# Patient Record
Sex: FEMALE | Race: BLACK OR AFRICAN AMERICAN | NOT HISPANIC OR LATINO | Employment: FULL TIME | URBAN - METROPOLITAN AREA
[De-identification: names, ages, dates, MRNs, and addresses within clinical notes are randomized per-mention and may not be internally consistent; named-entity substitution may affect disease eponyms.]

---

## 2023-03-22 LAB
ALANINE AMINOTRANSFERASE (SGPT) (U/L) IN SER/PLAS: 15 U/L (ref 7–45)
ALBUMIN (G/DL) IN SER/PLAS: 4.3 G/DL (ref 3.4–5)
ALKALINE PHOSPHATASE (U/L) IN SER/PLAS: 92 U/L (ref 33–110)
ANION GAP IN SER/PLAS: 10 MMOL/L (ref 10–20)
ASPARTATE AMINOTRANSFERASE (SGOT) (U/L) IN SER/PLAS: 26 U/L (ref 9–39)
BILIRUBIN TOTAL (MG/DL) IN SER/PLAS: 0.4 MG/DL (ref 0–1.2)
CALCIDIOL (25 OH VITAMIN D3) (NG/ML) IN SER/PLAS: 36 NG/ML
CALCIUM (MG/DL) IN SER/PLAS: 9.5 MG/DL (ref 8.6–10.3)
CARBON DIOXIDE, TOTAL (MMOL/L) IN SER/PLAS: 27 MMOL/L (ref 21–32)
CHLORIDE (MMOL/L) IN SER/PLAS: 106 MMOL/L (ref 98–107)
CHOLESTEROL (MG/DL) IN SER/PLAS: 189 MG/DL (ref 0–199)
CHOLESTEROL IN HDL (MG/DL) IN SER/PLAS: 67.2 MG/DL
CHOLESTEROL/HDL RATIO: 2.8
CREATININE (MG/DL) IN SER/PLAS: 0.6 MG/DL (ref 0.5–1.05)
GFR FEMALE: >90 ML/MIN/1.73M2
GLUCOSE (MG/DL) IN SER/PLAS: 85 MG/DL (ref 74–99)
LDL: 105 MG/DL (ref 0–99)
POTASSIUM (MMOL/L) IN SER/PLAS: 4.1 MMOL/L (ref 3.5–5.3)
PROTEIN TOTAL: 7.4 G/DL (ref 6.4–8.2)
SODIUM (MMOL/L) IN SER/PLAS: 139 MMOL/L (ref 136–145)
TRIGLYCERIDE (MG/DL) IN SER/PLAS: 84 MG/DL (ref 0–149)
UREA NITROGEN (MG/DL) IN SER/PLAS: 8 MG/DL (ref 6–23)
VLDL: 17 MG/DL (ref 0–40)

## 2023-03-23 LAB
ESTIMATED AVERAGE GLUCOSE FOR HBA1C: 120 MG/DL
HEMOGLOBIN A1C/HEMOGLOBIN TOTAL IN BLOOD: 5.8 %

## 2023-05-11 LAB — URINE CULTURE: NO GROWTH

## 2023-08-11 LAB
ALANINE AMINOTRANSFERASE (SGPT) (U/L) IN SER/PLAS: 11 U/L (ref 7–45)
ALBUMIN (G/DL) IN SER/PLAS: 4.5 G/DL (ref 3.4–5)
ALKALINE PHOSPHATASE (U/L) IN SER/PLAS: 80 U/L (ref 33–110)
ANION GAP IN SER/PLAS: 11 MMOL/L (ref 10–20)
ASPARTATE AMINOTRANSFERASE (SGOT) (U/L) IN SER/PLAS: 21 U/L (ref 9–39)
BILIRUBIN TOTAL (MG/DL) IN SER/PLAS: 0.6 MG/DL (ref 0–1.2)
CALCIUM (MG/DL) IN SER/PLAS: 9.4 MG/DL (ref 8.6–10.3)
CARBON DIOXIDE, TOTAL (MMOL/L) IN SER/PLAS: 26 MMOL/L (ref 21–32)
CHLORIDE (MMOL/L) IN SER/PLAS: 106 MMOL/L (ref 98–107)
CHOLESTEROL (MG/DL) IN SER/PLAS: 190 MG/DL (ref 0–199)
CHOLESTEROL IN HDL (MG/DL) IN SER/PLAS: 71 MG/DL
CHOLESTEROL/HDL RATIO: 2.7
CREATININE (MG/DL) IN SER/PLAS: 0.64 MG/DL (ref 0.5–1.05)
GFR FEMALE: >90 ML/MIN/1.73M2
GLUCOSE (MG/DL) IN SER/PLAS: 82 MG/DL (ref 74–99)
LDL: 104 MG/DL (ref 0–99)
POTASSIUM (MMOL/L) IN SER/PLAS: 3.8 MMOL/L (ref 3.5–5.3)
PROTEIN TOTAL: 7.4 G/DL (ref 6.4–8.2)
SODIUM (MMOL/L) IN SER/PLAS: 139 MMOL/L (ref 136–145)
TRIGLYCERIDE (MG/DL) IN SER/PLAS: 74 MG/DL (ref 0–149)
UREA NITROGEN (MG/DL) IN SER/PLAS: 7 MG/DL (ref 6–23)
VLDL: 15 MG/DL (ref 0–40)

## 2023-10-06 PROBLEM — I48.0 PAROXYSMAL ATRIAL FIBRILLATION (MULTI): Status: ACTIVE | Noted: 2023-10-06

## 2023-10-06 PROBLEM — F41.9 ANXIETY: Status: ACTIVE | Noted: 2023-10-06

## 2023-10-06 PROBLEM — E78.5 HYPERLIPIDEMIA: Status: ACTIVE | Noted: 2023-10-06

## 2023-10-06 PROBLEM — K21.9 GERD (GASTROESOPHAGEAL REFLUX DISEASE): Status: ACTIVE | Noted: 2023-10-06

## 2023-10-06 PROBLEM — K12.2 ORAL INFECTION: Status: ACTIVE | Noted: 2023-10-06

## 2023-10-06 PROBLEM — E55.9 VITAMIN D DEFICIENCY: Status: ACTIVE | Noted: 2023-10-06

## 2023-10-06 RX ORDER — METHYLPREDNISOLONE 4 MG/1
TABLET ORAL
COMMUNITY
Start: 2022-08-28 | End: 2024-04-03 | Stop reason: WASHOUT

## 2023-10-06 RX ORDER — IBUPROFEN 600 MG/1
600 TABLET ORAL 3 TIMES DAILY PRN
COMMUNITY
Start: 2022-08-28 | End: 2024-04-03 | Stop reason: WASHOUT

## 2023-10-06 RX ORDER — ACETAMINOPHEN 500 MG
1 TABLET ORAL DAILY
COMMUNITY
Start: 2018-09-05 | End: 2024-04-03 | Stop reason: SDUPTHER

## 2023-10-06 RX ORDER — DILTIAZEM HYDROCHLORIDE 120 MG/1
1 CAPSULE, EXTENDED RELEASE ORAL DAILY
COMMUNITY
Start: 2023-07-24 | End: 2024-04-03 | Stop reason: WASHOUT

## 2023-10-06 RX ORDER — SERTRALINE HYDROCHLORIDE 100 MG/1
100 TABLET, FILM COATED ORAL 2 TIMES DAILY
COMMUNITY
Start: 2007-08-17 | End: 2024-04-03 | Stop reason: SDUPTHER

## 2023-10-06 RX ORDER — OMEPRAZOLE 20 MG/1
20 CAPSULE, DELAYED RELEASE ORAL DAILY
COMMUNITY
Start: 2020-09-30 | End: 2024-04-03 | Stop reason: SDUPTHER

## 2023-10-06 RX ORDER — ATORVASTATIN CALCIUM 20 MG/1
20 TABLET, FILM COATED ORAL DAILY
COMMUNITY
Start: 2018-09-05 | End: 2024-03-11

## 2023-10-11 ENCOUNTER — OFFICE VISIT (OUTPATIENT)
Dept: PRIMARY CARE | Facility: CLINIC | Age: 53
End: 2023-10-11
Payer: COMMERCIAL

## 2023-10-11 VITALS
TEMPERATURE: 98.3 F | WEIGHT: 214 LBS | HEART RATE: 89 BPM | BODY MASS INDEX: 33.59 KG/M2 | DIASTOLIC BLOOD PRESSURE: 66 MMHG | SYSTOLIC BLOOD PRESSURE: 126 MMHG | OXYGEN SATURATION: 97 % | HEIGHT: 67 IN

## 2023-10-11 DIAGNOSIS — E55.9 VITAMIN D DEFICIENCY: ICD-10-CM

## 2023-10-11 DIAGNOSIS — F41.9 ANXIETY: Primary | ICD-10-CM

## 2023-10-11 DIAGNOSIS — K21.9 GASTROESOPHAGEAL REFLUX DISEASE, UNSPECIFIED WHETHER ESOPHAGITIS PRESENT: ICD-10-CM

## 2023-10-11 DIAGNOSIS — I48.0 PAROXYSMAL ATRIAL FIBRILLATION (MULTI): ICD-10-CM

## 2023-10-11 DIAGNOSIS — E78.5 HYPERLIPIDEMIA, UNSPECIFIED HYPERLIPIDEMIA TYPE: ICD-10-CM

## 2023-10-11 PROCEDURE — 1036F TOBACCO NON-USER: CPT | Performed by: PHYSICIAN ASSISTANT

## 2023-10-11 PROCEDURE — 99396 PREV VISIT EST AGE 40-64: CPT | Performed by: PHYSICIAN ASSISTANT

## 2023-10-11 RX ORDER — ERGOCALCIFEROL 1.25 MG/1
50000 CAPSULE ORAL WEEKLY
COMMUNITY
End: 2024-04-03 | Stop reason: WASHOUT

## 2023-10-11 RX ORDER — DILTIAZEM HYDROCHLORIDE 120 MG/1
120 CAPSULE, COATED, EXTENDED RELEASE ORAL DAILY
COMMUNITY
Start: 2023-09-14 | End: 2024-04-03 | Stop reason: SDUPTHER

## 2023-10-11 RX ORDER — SIMVASTATIN 40 MG/1
40 TABLET, FILM COATED ORAL NIGHTLY
COMMUNITY
End: 2024-04-03 | Stop reason: WASHOUT

## 2023-10-11 ASSESSMENT — ENCOUNTER SYMPTOMS
CARDIOVASCULAR NEGATIVE: 1
HEMATOLOGIC/LYMPHATIC NEGATIVE: 1
PSYCHIATRIC NEGATIVE: 1
CONSTITUTIONAL NEGATIVE: 1
NEUROLOGICAL NEGATIVE: 1
ENDOCRINE NEGATIVE: 1
ALLERGIC/IMMUNOLOGIC NEGATIVE: 1
EYES NEGATIVE: 1
RESPIRATORY NEGATIVE: 1

## 2023-10-11 ASSESSMENT — PAIN SCALES - GENERAL: PAINLEVEL: 0-NO PAIN

## 2023-10-11 NOTE — PROGRESS NOTES
Subjective   Patient ID: Jud Colunga is a 52 y.o. female who presents for a well exam      HPI Here for physical and fill out forms for insurance.    Pt reports she is UTD with colon screening but is overdue for mammogram testing. She is not due to have dexa scan testing. PT is not due for pap/pelvic testing  PT denies family hx for breast, colon, cervical or ovarian cancer.   Pt reports she has UTD with dental and eye exams.   She denies recreational drug use and  tobacco use - reports occasional ETOH use.        Review of Systems   Constitutional: Negative.    HENT: Negative.     Eyes: Negative.    Respiratory: Negative.     Cardiovascular: Negative.    Endocrine: Negative.    Genitourinary: Negative.    Skin: Negative.    Allergic/Immunologic: Negative.    Neurological: Negative.    Hematological: Negative.    Psychiatric/Behavioral: Negative.         Objective   There were no vitals taken for this visit.    Physical Exam  Constitutional:       Appearance: Normal appearance.   HENT:      Head: Normocephalic and atraumatic.      Nose: Nose normal.      Mouth/Throat:      Mouth: Mucous membranes are moist.      Pharynx: Oropharynx is clear.   Eyes:      Extraocular Movements: Extraocular movements intact.      Conjunctiva/sclera: Conjunctivae normal.      Pupils: Pupils are equal, round, and reactive to light.   Cardiovascular:      Rate and Rhythm: Normal rate and regular rhythm.      Heart sounds: Normal heart sounds.   Pulmonary:      Effort: Pulmonary effort is normal.      Breath sounds: Normal breath sounds.   Abdominal:      General: Bowel sounds are normal.      Palpations: Abdomen is soft.   Musculoskeletal:         General: Normal range of motion.      Cervical back: Normal range of motion and neck supple.   Skin:     General: Skin is warm and dry.      Coloration: Skin is not jaundiced.   Neurological:      General: No focal deficit present.      Mental Status: She is alert and oriented to person,  place, and time.   Psychiatric:         Mood and Affect: Mood normal.         Behavior: Behavior normal.         Thought Content: Thought content normal.         Judgment: Judgment normal.         Assessment/Plan 53 YO female presents for her annual well exam. She states she follows with GYN svcs for her pap and mammogram testing and reports she is UTD with both. She is UTD with colon screening. We discussed her labs from her appt in August of a CMP and Lipid - she wishes to have these for her well exam labs and does not want to have a CBC completed or any additional lab screenings. We discussed Tdap and pt does not wish to have any additional vaccines done right now - she will consider it in the future. She will follow up when she is due for refills - sooner if she has any complications.

## 2024-04-03 ENCOUNTER — LAB (OUTPATIENT)
Dept: LAB | Facility: LAB | Age: 54
End: 2024-04-03
Payer: COMMERCIAL

## 2024-04-03 ENCOUNTER — OFFICE VISIT (OUTPATIENT)
Dept: PRIMARY CARE | Facility: CLINIC | Age: 54
End: 2024-04-03
Payer: COMMERCIAL

## 2024-04-03 VITALS
HEART RATE: 86 BPM | DIASTOLIC BLOOD PRESSURE: 72 MMHG | WEIGHT: 217 LBS | OXYGEN SATURATION: 94 % | HEIGHT: 67 IN | SYSTOLIC BLOOD PRESSURE: 126 MMHG | BODY MASS INDEX: 34.06 KG/M2

## 2024-04-03 DIAGNOSIS — I48.0 PAROXYSMAL ATRIAL FIBRILLATION (MULTI): ICD-10-CM

## 2024-04-03 DIAGNOSIS — K21.9 GASTROESOPHAGEAL REFLUX DISEASE, UNSPECIFIED WHETHER ESOPHAGITIS PRESENT: ICD-10-CM

## 2024-04-03 DIAGNOSIS — Z12.31 BREAST CANCER SCREENING BY MAMMOGRAM: ICD-10-CM

## 2024-04-03 DIAGNOSIS — E78.5 HYPERLIPIDEMIA, UNSPECIFIED HYPERLIPIDEMIA TYPE: ICD-10-CM

## 2024-04-03 DIAGNOSIS — R73.9 HYPERGLYCEMIA: ICD-10-CM

## 2024-04-03 DIAGNOSIS — E78.5 HYPERLIPIDEMIA, UNSPECIFIED HYPERLIPIDEMIA TYPE: Primary | ICD-10-CM

## 2024-04-03 DIAGNOSIS — F41.9 ANXIETY: ICD-10-CM

## 2024-04-03 DIAGNOSIS — E55.9 VITAMIN D DEFICIENCY: ICD-10-CM

## 2024-04-03 PROBLEM — R52 PAIN: Status: RESOLVED | Noted: 2018-12-26 | Resolved: 2024-04-03

## 2024-04-03 PROBLEM — L25.9 CONTACT DERMATITIS: Status: RESOLVED | Noted: 2024-04-03 | Resolved: 2024-04-03

## 2024-04-03 PROBLEM — Z86.39 HISTORY OF ELEVATED LIPIDS: Status: RESOLVED | Noted: 2024-04-03 | Resolved: 2024-04-03

## 2024-04-03 PROBLEM — D72.829 LEUKOCYTOSIS: Status: RESOLVED | Noted: 2024-04-03 | Resolved: 2024-04-03

## 2024-04-03 PROBLEM — R30.0 DYSURIA: Status: RESOLVED | Noted: 2024-04-03 | Resolved: 2024-04-03

## 2024-04-03 PROBLEM — R42 DIZZINESS: Status: RESOLVED | Noted: 2021-07-15 | Resolved: 2024-04-03

## 2024-04-03 PROBLEM — M25.512 PAIN OF LEFT SHOULDER REGION: Status: RESOLVED | Noted: 2024-04-03 | Resolved: 2024-04-03

## 2024-04-03 PROBLEM — I48.92 ATRIAL FLUTTER (MULTI): Status: RESOLVED | Noted: 2019-01-12 | Resolved: 2024-04-03

## 2024-04-03 PROBLEM — M25.532 PAIN IN LEFT WRIST: Status: RESOLVED | Noted: 2018-12-26 | Resolved: 2024-04-03

## 2024-04-03 PROBLEM — E66.9 OBESITY: Status: ACTIVE | Noted: 2024-04-03

## 2024-04-03 PROBLEM — R42 VERTIGO: Status: ACTIVE | Noted: 2021-07-15

## 2024-04-03 PROBLEM — I10 HYPERTENSION: Status: ACTIVE | Noted: 2024-04-03

## 2024-04-03 LAB
ALBUMIN SERPL BCP-MCNC: 4.6 G/DL (ref 3.4–5)
ALP SERPL-CCNC: 95 U/L (ref 33–110)
ALT SERPL W P-5'-P-CCNC: 13 U/L (ref 7–45)
ANION GAP SERPL CALC-SCNC: 13 MMOL/L (ref 10–20)
AST SERPL W P-5'-P-CCNC: 24 U/L (ref 9–39)
BILIRUB SERPL-MCNC: 0.4 MG/DL (ref 0–1.2)
BUN SERPL-MCNC: 9 MG/DL (ref 6–23)
CALCIUM SERPL-MCNC: 9.3 MG/DL (ref 8.6–10.3)
CHLORIDE SERPL-SCNC: 103 MMOL/L (ref 98–107)
CHOLEST SERPL-MCNC: 204 MG/DL (ref 0–199)
CHOLESTEROL/HDL RATIO: 2.5
CO2 SERPL-SCNC: 26 MMOL/L (ref 21–32)
CREAT SERPL-MCNC: 0.69 MG/DL (ref 0.5–1.05)
EGFRCR SERPLBLD CKD-EPI 2021: >90 ML/MIN/1.73M*2
GLUCOSE SERPL-MCNC: 79 MG/DL (ref 74–99)
HDLC SERPL-MCNC: 82.6 MG/DL
LDLC SERPL CALC-MCNC: 108 MG/DL
NON HDL CHOLESTEROL: 121 MG/DL (ref 0–149)
POTASSIUM SERPL-SCNC: 4.3 MMOL/L (ref 3.5–5.3)
PROT SERPL-MCNC: 7.1 G/DL (ref 6.4–8.2)
SODIUM SERPL-SCNC: 138 MMOL/L (ref 136–145)
TRIGL SERPL-MCNC: 69 MG/DL (ref 0–149)
VLDL: 14 MG/DL (ref 0–40)

## 2024-04-03 PROCEDURE — 36415 COLL VENOUS BLD VENIPUNCTURE: CPT

## 2024-04-03 PROCEDURE — 99214 OFFICE O/P EST MOD 30 MIN: CPT | Performed by: PHYSICIAN ASSISTANT

## 2024-04-03 PROCEDURE — 83036 HEMOGLOBIN GLYCOSYLATED A1C: CPT

## 2024-04-03 PROCEDURE — 3078F DIAST BP <80 MM HG: CPT | Performed by: PHYSICIAN ASSISTANT

## 2024-04-03 PROCEDURE — 80053 COMPREHEN METABOLIC PANEL: CPT

## 2024-04-03 PROCEDURE — 80061 LIPID PANEL: CPT

## 2024-04-03 PROCEDURE — 3074F SYST BP LT 130 MM HG: CPT | Performed by: PHYSICIAN ASSISTANT

## 2024-04-03 PROCEDURE — 1036F TOBACCO NON-USER: CPT | Performed by: PHYSICIAN ASSISTANT

## 2024-04-03 RX ORDER — SERTRALINE HYDROCHLORIDE 100 MG/1
200 TABLET, FILM COATED ORAL DAILY
Qty: 180 TABLET | Refills: 1 | Status: SHIPPED | OUTPATIENT
Start: 2024-04-03 | End: 2024-09-30

## 2024-04-03 RX ORDER — DILTIAZEM HYDROCHLORIDE 120 MG/1
120 CAPSULE, COATED, EXTENDED RELEASE ORAL DAILY
Qty: 90 CAPSULE | Refills: 1 | Status: SHIPPED | OUTPATIENT
Start: 2024-04-03 | End: 2024-09-30

## 2024-04-03 RX ORDER — ACETAMINOPHEN 500 MG
2000 TABLET ORAL DAILY
Qty: 90 CAPSULE | Refills: 1 | Status: SHIPPED | OUTPATIENT
Start: 2024-04-03 | End: 2024-09-30

## 2024-04-03 RX ORDER — OMEPRAZOLE 20 MG/1
20 CAPSULE, DELAYED RELEASE ORAL DAILY
Qty: 90 CAPSULE | Refills: 1 | Status: SHIPPED | OUTPATIENT
Start: 2024-04-03 | End: 2024-09-30

## 2024-04-03 RX ORDER — ATORVASTATIN CALCIUM 20 MG/1
20 TABLET, FILM COATED ORAL DAILY
Qty: 90 TABLET | Refills: 1 | Status: SHIPPED | OUTPATIENT
Start: 2024-04-03 | End: 2024-09-30

## 2024-04-03 NOTE — ASSESSMENT & PLAN NOTE
PT is stable/asymptomatic with medication - seeing cardiology as needed - we continue monitoring sx's.

## 2024-04-03 NOTE — PROGRESS NOTES
"Subjective   Patient ID: Jud Colunga is a 53 y.o. female who presents for Med Refill.    HPI Pt is here for refills of medications to treat the following medical conditions. Unless otherwise stated, these conditions are well-controlled, pt is taking medications s Rx, and there are no reported side effects to medications or other treatment:  Proximal Afib, Hyperlipidemia, Vit D def, GERD and anxiety    Review of Systems  Constitutional: Negative.    HENT: Negative.     Respiratory: Negative.  Negative for cough, shortness of breath and wheezing.    Cardiovascular: Negative.  Negative for chest pain and palpitations.   Gastrointestinal: Negative.    Musculoskeletal: Negative.    Neurological: Negative.    Hematological: Negative.    Psychiatric/Behavioral: Negative.     All other systems reviewed and are negative.      Objective   /72 (BP Location: Right arm, Patient Position: Sitting, BP Cuff Size: Large adult)   Pulse 86   Ht 1.702 m (5' 7\")   Wt 98.4 kg (217 lb)   SpO2 94%   BMI 33.99 kg/m²     Physical Exam  Vitals and nursing note reviewed.   Constitutional:       General Appearance: Normal appearance, no distress, not ill-appearing.   HENT:      Head: Normocephalic and atraumatic.      Mouth/Throat:  MMM, Oropharynx is clear without erythema or exudate  Eyes:      Conjunctiva/sclera: Conjunctivae normal.   Cardiovascular:      Normal rate and regular rhythm: Normal heart sounds.   Pulmonary:      Pulmonary effort is normal. Normal breath sounds. No wheezing.   Abdominal:      General: Bowel sounds are normal. Abdomen is soft, non- tender, no masses.  Musculoskeletal:         General: Normal range of motion.    Lymphadenopathy:      Cervical:  Supple, non-tender, no cervical adenopathy.   Skin:     General: Skin is warm and dry, no rash or jaundice.    Neurological:      No focal deficit present. Alert and oriented to person, place, and time.   Psychiatric:         Mood and Affect: Mood normal.        "  Behavior: Behavior normal.         Thought Content: Thought content normal.         Judgment: Judgment normal.       Assessment/Plan Jud Colunga presents for chronic medication refills without complaint.  She appears to be doing well - her exam was unremarkable. Will renew chronic medication(s) without changes and check labs. She will follow up if necessary once labs are reviewed otherwise we will see her back when she is  due for refills - sooner if she has  any complications.    Diagnoses and all orders for this visit:  Hyperlipidemia, unspecified hyperlipidemia type  -     Lipid Panel; Future  -     Comprehensive Metabolic Panel; Future  -     atorvastatin (Lipitor) 20 mg tablet; Take 1 tablet (20 mg) by mouth once daily. as directed.   Appointment needed for refills.  Breast cancer screening by mammogram  -     BI mammo bilateral screening tomosynthesis; Future  Paroxysmal atrial fibrillation (CMS/HCC)  -     dilTIAZem CD (Cardizem CD) 120 mg 24 hr capsule; Take 1 capsule (120 mg) by mouth once daily.  Hyperglycemia  -     Hemoglobin A1C; Future  Vitamin D deficiency  -     cholecalciferol (Vitamin D-3) 50 mcg (2,000 unit) capsule; Take 1 capsule (50 mcg) by mouth once daily.  Gastroesophageal reflux disease, unspecified whether esophagitis present  -     omeprazole (PriLOSEC) 20 mg DR capsule; Take 1 capsule (20 mg) by mouth once daily.  Anxiety  -     sertraline (Zoloft) 100 mg tablet; Take 2 tablets (200 mg) by mouth once daily.    Paroxysmal Atrial Fibrillation (CMS/HCC)   PT is stable/asymptomatic with medication - seeing cardiology as needed - we continue monitoring sx's.

## 2024-04-04 LAB
EST. AVERAGE GLUCOSE BLD GHB EST-MCNC: 137 MG/DL
HBA1C MFR BLD: 6.4 %

## 2024-04-05 ENCOUNTER — TELEPHONE (OUTPATIENT)
Dept: PRIMARY CARE | Facility: CLINIC | Age: 54
End: 2024-04-05
Payer: COMMERCIAL

## 2024-04-06 DIAGNOSIS — R73.9 HYPERGLYCEMIA: Primary | ICD-10-CM

## 2024-04-06 RX ORDER — METFORMIN HYDROCHLORIDE 500 MG/1
500 TABLET, EXTENDED RELEASE ORAL
Qty: 90 TABLET | Refills: 0 | Status: SHIPPED | OUTPATIENT
Start: 2024-04-06 | End: 2024-07-05

## 2024-04-24 ENCOUNTER — HOSPITAL ENCOUNTER (OUTPATIENT)
Dept: RADIOLOGY | Facility: CLINIC | Age: 54
Discharge: HOME | End: 2024-04-24
Payer: COMMERCIAL

## 2024-04-24 DIAGNOSIS — Z12.31 BREAST CANCER SCREENING BY MAMMOGRAM: ICD-10-CM

## 2024-04-24 PROCEDURE — 77063 BREAST TOMOSYNTHESIS BI: CPT | Performed by: RADIOLOGY

## 2024-04-24 PROCEDURE — 77067 SCR MAMMO BI INCL CAD: CPT | Performed by: RADIOLOGY

## 2024-04-24 PROCEDURE — 77067 SCR MAMMO BI INCL CAD: CPT

## 2024-06-26 ENCOUNTER — LAB (OUTPATIENT)
Dept: LAB | Facility: LAB | Age: 54
End: 2024-06-26
Payer: COMMERCIAL

## 2024-06-26 ENCOUNTER — APPOINTMENT (OUTPATIENT)
Dept: PRIMARY CARE | Facility: CLINIC | Age: 54
End: 2024-06-26
Payer: COMMERCIAL

## 2024-06-26 VITALS
HEART RATE: 95 BPM | DIASTOLIC BLOOD PRESSURE: 72 MMHG | SYSTOLIC BLOOD PRESSURE: 126 MMHG | HEIGHT: 67 IN | OXYGEN SATURATION: 95 % | WEIGHT: 208.2 LBS | BODY MASS INDEX: 32.68 KG/M2

## 2024-06-26 DIAGNOSIS — R73.9 HYPERGLYCEMIA: ICD-10-CM

## 2024-06-26 LAB
ANION GAP SERPL CALC-SCNC: 10 MMOL/L (ref 10–20)
BUN SERPL-MCNC: 7 MG/DL (ref 6–23)
CALCIUM SERPL-MCNC: 9.2 MG/DL (ref 8.6–10.3)
CHLORIDE SERPL-SCNC: 106 MMOL/L (ref 98–107)
CO2 SERPL-SCNC: 25 MMOL/L (ref 21–32)
CREAT SERPL-MCNC: 0.62 MG/DL (ref 0.5–1.05)
EGFRCR SERPLBLD CKD-EPI 2021: >90 ML/MIN/1.73M*2
GLUCOSE SERPL-MCNC: 87 MG/DL (ref 74–99)
POTASSIUM SERPL-SCNC: 4.2 MMOL/L (ref 3.5–5.3)
SODIUM SERPL-SCNC: 137 MMOL/L (ref 136–145)

## 2024-06-26 PROCEDURE — 83036 HEMOGLOBIN GLYCOSYLATED A1C: CPT

## 2024-06-26 PROCEDURE — 1036F TOBACCO NON-USER: CPT | Performed by: PHYSICIAN ASSISTANT

## 2024-06-26 PROCEDURE — 80048 BASIC METABOLIC PNL TOTAL CA: CPT

## 2024-06-26 PROCEDURE — 3078F DIAST BP <80 MM HG: CPT | Performed by: PHYSICIAN ASSISTANT

## 2024-06-26 PROCEDURE — 99213 OFFICE O/P EST LOW 20 MIN: CPT | Performed by: PHYSICIAN ASSISTANT

## 2024-06-26 PROCEDURE — 36415 COLL VENOUS BLD VENIPUNCTURE: CPT

## 2024-06-26 PROCEDURE — 3074F SYST BP LT 130 MM HG: CPT | Performed by: PHYSICIAN ASSISTANT

## 2024-06-26 RX ORDER — METFORMIN HYDROCHLORIDE 500 MG/1
500 TABLET, EXTENDED RELEASE ORAL
Qty: 90 TABLET | Refills: 0 | Status: SHIPPED | OUTPATIENT
Start: 2024-06-26 | End: 2024-09-24

## 2024-06-26 ASSESSMENT — PATIENT HEALTH QUESTIONNAIRE - PHQ9
SUM OF ALL RESPONSES TO PHQ9 QUESTIONS 1 AND 2: 0
2. FEELING DOWN, DEPRESSED OR HOPELESS: NOT AT ALL
1. LITTLE INTEREST OR PLEASURE IN DOING THINGS: NOT AT ALL

## 2024-06-26 NOTE — LETTER
To whom it may concern,       Jud Colunga had her mammogram completed on 4/28/24 with [facility].  If you have any additional questions, please call the office at [phone].         Thank you,             America Quiles PA-C

## 2024-06-26 NOTE — PROGRESS NOTES
"Subjective   Patient ID: Jud Colunga is a 53 y.o. female who presents for Medication refills.    HPI Pt is here for refills of medications to treat the following medical conditions. Unless otherwise stated, these conditions are well-controlled, pt is taking medications s Rx, and there are no reported side effects to medications or other treatment: Hyperglycemia. Pt reports tolerating the medication without complications.     Also needs forms filled for work for her last physical    Review of Systems  Constitutional: Negative.    HENT: Negative.     Respiratory: Negative.  Negative for cough, shortness of breath and wheezing.    Cardiovascular: Negative.  Negative for chest pain and palpitations.   Gastrointestinal: Negative.    Musculoskeletal: Negative.    Neurological: Negative.    Hematological: Negative.    Psychiatric/Behavioral: Negative.     All other systems reviewed and are negative.      Objective   /72 (BP Location: Left arm, Patient Position: Sitting, BP Cuff Size: Large adult)   Pulse 95   Ht 1.702 m (5' 7\")   Wt 94.4 kg (208 lb 3.2 oz)   SpO2 95%   BMI 32.61 kg/m²     Physical Exam  Vitals and nursing note reviewed.   Constitutional:       General Appearance: Normal appearance, no distress, not ill-appearing.   HENT:      Head: Normocephalic and atraumatic.      Mouth/Throat:  MMM, Oropharynx is clear without erythema or exudate  Eyes:      Conjunctiva/sclera: Conjunctivae normal.   Cardiovascular:      Normal rate and regular rhythm: Normal heart sounds.   Pulmonary:      Pulmonary effort is normal. Normal breath sounds. No wheezing.   Abdominal:      General: Bowel sounds are normal. Abdomen is soft, non- tender, no masses.  Musculoskeletal:         General: Normal range of motion.    Lymphadenopathy:      Cervical:  Supple, non-tender, no cervical adenopathy.   Skin:     General: Skin is warm and dry, no rash or jaundice.    Neurological:      No focal deficit present. Alert and oriented " to person, place, and time.   Psychiatric:         Mood and Affect: Mood normal.         Behavior: Behavior normal.         Thought Content: Thought content normal.         Judgment: Judgment normal.       Assessment/Plan Jud Colunga presents for follow up of hyperglycemia after starting Metformin. She is tolerating it well -  her exam was unremarkable. Will renew chronic medication(s) without changes and check labs. She will follow up if necessary once labs are reviewed otherwise we will see her back when she is  due for refills - sooner if she has  any complications.  Forms were completed for lab information requested by her employer for insurance - forms were returned to PT with copies scanned to her chart.    Diagnoses and all orders for this visit:  Hyperglycemia  -     metFORMIN XR (Glucophage-XR) 500 mg 24 hr tablet; Take 1 tablet (500 mg) by mouth once daily with breakfast. Do not crush, chew, or split.  -     Basic Metabolic Panel; Future  -     Hemoglobin A1C; Future

## 2024-06-27 LAB
EST. AVERAGE GLUCOSE BLD GHB EST-MCNC: 126 MG/DL
HBA1C MFR BLD: 6 %

## 2024-10-02 ENCOUNTER — APPOINTMENT (OUTPATIENT)
Dept: PRIMARY CARE | Facility: CLINIC | Age: 54
End: 2024-10-02
Payer: COMMERCIAL

## 2024-10-02 ENCOUNTER — LAB (OUTPATIENT)
Dept: LAB | Facility: LAB | Age: 54
End: 2024-10-02
Payer: COMMERCIAL

## 2024-10-02 VITALS
WEIGHT: 209.6 LBS | BODY MASS INDEX: 32.9 KG/M2 | DIASTOLIC BLOOD PRESSURE: 82 MMHG | OXYGEN SATURATION: 95 % | HEART RATE: 74 BPM | HEIGHT: 67 IN | SYSTOLIC BLOOD PRESSURE: 120 MMHG

## 2024-10-02 DIAGNOSIS — R73.9 HYPERGLYCEMIA: ICD-10-CM

## 2024-10-02 DIAGNOSIS — K21.9 GASTROESOPHAGEAL REFLUX DISEASE, UNSPECIFIED WHETHER ESOPHAGITIS PRESENT: ICD-10-CM

## 2024-10-02 DIAGNOSIS — E78.5 HYPERLIPIDEMIA, UNSPECIFIED HYPERLIPIDEMIA TYPE: ICD-10-CM

## 2024-10-02 DIAGNOSIS — Z00.00 WELL ADULT EXAM: ICD-10-CM

## 2024-10-02 DIAGNOSIS — F41.9 ANXIETY: ICD-10-CM

## 2024-10-02 DIAGNOSIS — Z00.00 WELL ADULT EXAM: Primary | ICD-10-CM

## 2024-10-02 DIAGNOSIS — E55.9 VITAMIN D DEFICIENCY: ICD-10-CM

## 2024-10-02 DIAGNOSIS — I48.0 PAROXYSMAL ATRIAL FIBRILLATION (MULTI): ICD-10-CM

## 2024-10-02 LAB
ALBUMIN SERPL BCP-MCNC: 4.5 G/DL (ref 3.4–5)
ALP SERPL-CCNC: 89 U/L (ref 33–110)
ALT SERPL W P-5'-P-CCNC: 10 U/L (ref 7–45)
ANION GAP SERPL CALC-SCNC: 11 MMOL/L (ref 10–20)
AST SERPL W P-5'-P-CCNC: 17 U/L (ref 9–39)
BILIRUB SERPL-MCNC: 0.5 MG/DL (ref 0–1.2)
BUN SERPL-MCNC: 8 MG/DL (ref 6–23)
CALCIUM SERPL-MCNC: 9.2 MG/DL (ref 8.6–10.3)
CHLORIDE SERPL-SCNC: 105 MMOL/L (ref 98–107)
CHOLEST SERPL-MCNC: 204 MG/DL (ref 0–199)
CHOLESTEROL/HDL RATIO: 2.6
CO2 SERPL-SCNC: 26 MMOL/L (ref 21–32)
CREAT SERPL-MCNC: 0.71 MG/DL (ref 0.5–1.05)
EGFRCR SERPLBLD CKD-EPI 2021: >90 ML/MIN/1.73M*2
ERYTHROCYTE [DISTWIDTH] IN BLOOD BY AUTOMATED COUNT: 13.2 % (ref 11.5–14.5)
GLUCOSE SERPL-MCNC: 99 MG/DL (ref 74–99)
HCT VFR BLD AUTO: 40.8 % (ref 36–46)
HDLC SERPL-MCNC: 77.1 MG/DL
HGB BLD-MCNC: 13.7 G/DL (ref 12–16)
LDLC SERPL CALC-MCNC: 108 MG/DL
MCH RBC QN AUTO: 30.2 PG (ref 26–34)
MCHC RBC AUTO-ENTMCNC: 33.6 G/DL (ref 32–36)
MCV RBC AUTO: 90 FL (ref 80–100)
NON HDL CHOLESTEROL: 127 MG/DL (ref 0–149)
NRBC BLD-RTO: 0 /100 WBCS (ref 0–0)
PLATELET # BLD AUTO: 207 X10*3/UL (ref 150–450)
POTASSIUM SERPL-SCNC: 4.1 MMOL/L (ref 3.5–5.3)
PROT SERPL-MCNC: 7 G/DL (ref 6.4–8.2)
RBC # BLD AUTO: 4.53 X10*6/UL (ref 4–5.2)
SODIUM SERPL-SCNC: 138 MMOL/L (ref 136–145)
TRIGL SERPL-MCNC: 93 MG/DL (ref 0–149)
VLDL: 19 MG/DL (ref 0–40)
WBC # BLD AUTO: 3.5 X10*3/UL (ref 4.4–11.3)

## 2024-10-02 PROCEDURE — 1036F TOBACCO NON-USER: CPT | Performed by: PHYSICIAN ASSISTANT

## 2024-10-02 PROCEDURE — 85027 COMPLETE CBC AUTOMATED: CPT

## 2024-10-02 PROCEDURE — 99213 OFFICE O/P EST LOW 20 MIN: CPT | Performed by: PHYSICIAN ASSISTANT

## 2024-10-02 PROCEDURE — 3079F DIAST BP 80-89 MM HG: CPT | Performed by: PHYSICIAN ASSISTANT

## 2024-10-02 PROCEDURE — 36415 COLL VENOUS BLD VENIPUNCTURE: CPT

## 2024-10-02 PROCEDURE — 99396 PREV VISIT EST AGE 40-64: CPT | Performed by: PHYSICIAN ASSISTANT

## 2024-10-02 PROCEDURE — 3074F SYST BP LT 130 MM HG: CPT | Performed by: PHYSICIAN ASSISTANT

## 2024-10-02 PROCEDURE — 80061 LIPID PANEL: CPT

## 2024-10-02 PROCEDURE — 3008F BODY MASS INDEX DOCD: CPT | Performed by: PHYSICIAN ASSISTANT

## 2024-10-02 PROCEDURE — 83036 HEMOGLOBIN GLYCOSYLATED A1C: CPT

## 2024-10-02 PROCEDURE — 80053 COMPREHEN METABOLIC PANEL: CPT

## 2024-10-02 RX ORDER — METFORMIN HYDROCHLORIDE 500 MG/1
500 TABLET, EXTENDED RELEASE ORAL
Qty: 90 TABLET | Refills: 0 | Status: SHIPPED | OUTPATIENT
Start: 2024-10-02 | End: 2024-12-31

## 2024-10-02 RX ORDER — OMEPRAZOLE 20 MG/1
20 CAPSULE, DELAYED RELEASE ORAL DAILY
Qty: 90 CAPSULE | Refills: 0 | Status: SHIPPED | OUTPATIENT
Start: 2024-10-02 | End: 2024-12-31

## 2024-10-02 RX ORDER — DILTIAZEM HYDROCHLORIDE 120 MG/1
120 CAPSULE, COATED, EXTENDED RELEASE ORAL DAILY
Qty: 90 CAPSULE | Refills: 0 | Status: SHIPPED | OUTPATIENT
Start: 2024-10-02 | End: 2024-12-31

## 2024-10-02 RX ORDER — SERTRALINE HYDROCHLORIDE 100 MG/1
200 TABLET, FILM COATED ORAL DAILY
Qty: 180 TABLET | Refills: 0 | Status: SHIPPED | OUTPATIENT
Start: 2024-10-02 | End: 2024-12-31

## 2024-10-02 RX ORDER — ACETAMINOPHEN 500 MG
2000 TABLET ORAL DAILY
Qty: 90 CAPSULE | Refills: 0 | Status: SHIPPED | OUTPATIENT
Start: 2024-10-02 | End: 2024-12-31

## 2024-10-02 RX ORDER — ATORVASTATIN CALCIUM 20 MG/1
20 TABLET, FILM COATED ORAL DAILY
Qty: 90 TABLET | Refills: 0 | Status: SHIPPED | OUTPATIENT
Start: 2024-10-02 | End: 2024-12-31

## 2024-10-02 ASSESSMENT — PATIENT HEALTH QUESTIONNAIRE - PHQ9
1. LITTLE INTEREST OR PLEASURE IN DOING THINGS: NOT AT ALL
SUM OF ALL RESPONSES TO PHQ9 QUESTIONS 1 AND 2: 0
2. FEELING DOWN, DEPRESSED OR HOPELESS: NOT AT ALL

## 2024-10-02 NOTE — PROGRESS NOTES
"Subjective   Patient ID: Jud Colunga is a 53 y.o. female who presents for Annual Exam.    HPI  Patient is here for her annual physical. She is fasting today for any blood work needed. PT presents without new complaint and states overall she is doing well.  She also needs medication refills.     Pt is here for refills of medications to treat the following medical conditions. Unless otherwise stated, these conditions are well-controlled, pt is taking medications s Rx, and there are no reported side effects to medications or other treatment: Anxiety, GERD, Hyperglycemia, Hyperlipidemia, A Fib, Vitamin D deficiency     PM/PS/FAM/SOC Hx reviewed and updated    Review of Systems  Constitutional: Negative.    HENT: Negative.     Respiratory: Negative.  Negative for cough, shortness of breath and wheezing.    Cardiovascular: Negative.  Negative for chest pain and palpitations.   Gastrointestinal: Negative.    Musculoskeletal: Negative.    Neurological: Negative.    Hematological: Negative.    Psychiatric/Behavioral: Negative.     All other systems reviewed and are negative.    Objective   /82   Pulse 74   Ht 1.708 m (5' 7.25\")   Wt 95.1 kg (209 lb 9.6 oz)   SpO2 95%   BMI 32.58 kg/m²     Physical Exam  Physical Exam  Vitals and nursing note reviewed.   Constitutional:       General: PT is not in acute distress.     Appearance: Normal appearance. PT is not ill-appearing.   HENT:      Head: Normocephalic and atraumatic.      Right Ear: Tympanic membrane, ear canal and external ear normal.      Left Ear: Tympanic membrane, ear canal and external ear normal.      Nose: Nose normal.      Mouth/Throat:      Mouth: Mucous membranes are moist.      Pharynx: Oropharynx is clear. No oropharyngeal exudate or posterior oropharyngeal erythema.   Eyes:      Extraocular Movements: Extraocular movements intact.      Conjunctiva/sclera: Conjunctivae normal.      Pupils: Pupils are equal, round, and reactive to light.   Neck:     "  Vascular: No carotid bruit.       Supply, no tenderness, thyroid enlargement or nodules   Cardiovascular:      Rate and Rhythm: Normal rate and regular rhythm.      Heart sounds: Normal heart sounds.   Pulmonary:      Effort: Pulmonary effort is normal.      Breath sounds: Normal breath sounds. No wheezing.   Abdominal:      General: Bowel sounds are normal.      Palpations: Abdomen is soft. There is no mass.      Tenderness: There is no abdominal tenderness.   Musculoskeletal:         General: Normal range of motion.      Cervical back: Neck supple. No tenderness.   Lymphadenopathy:      Cervical: No cervical adenopathy.   Skin:     General: Skin is warm and dry. No rashes or lesions.     Capillary Refill: Capillary refill takes less than 2 seconds.   Neurological:      General: No focal deficit present.      Mental Status: PT is alert and oriented to person, place, and time.   Psychiatric:         Mood and Affect: Mood normal.         Behavior: Behavior normal.         Thought Content: Thought content normal.         Judgment: Judgment normal.     Assessment/Plan  54 YO Jud Colunga presents for her annual well exam with refills without new complaint. She reports she is doing well - her exam today was unremarkable. Will check well exam labs today and follow up once they are reviewed. Will also renew chronic medication without changes - she was also given a recommendation to complete her Hep B series with her pharmacy. We will plan to see her back when she is due for refills - sooner if she has any complications.   Diagnoses and all orders for this visit:  Well adult exam  -     Comprehensive Metabolic Panel; Future  -     Lipid Panel; Future  -     CBC; Future  Anxiety  -     sertraline (Zoloft) 100 mg tablet; Take 2 tablets (200 mg) by mouth once daily.  Gastroesophageal reflux disease, unspecified whether esophagitis present  -     omeprazole (PriLOSEC) 20 mg DR capsule; Take 1 capsule (20 mg) by mouth once  daily.  Hyperglycemia  -     metFORMIN XR (Glucophage-XR) 500 mg 24 hr tablet; Take 1 tablet (500 mg) by mouth once daily with breakfast. Do not crush, chew, or split.  -     Hemoglobin A1C; Future  Hyperlipidemia, unspecified hyperlipidemia type  -     atorvastatin (Lipitor) 20 mg tablet; Take 1 tablet (20 mg) by mouth once daily. as directed.   Appointment needed for refills.  -     Lipid Panel; Future  Paroxysmal atrial fibrillation (Multi)  -     dilTIAZem CD (Cardizem CD) 120 mg 24 hr capsule; Take 1 capsule (120 mg) by mouth once daily.  Vitamin D deficiency  -     cholecalciferol (Vitamin D3) 50 mcg (2,000 unit) capsule; Take 1 capsule (50 mcg) by mouth once daily.

## 2024-10-03 DIAGNOSIS — R79.9 ABNORMAL BLOOD CHEMISTRY: Primary | ICD-10-CM

## 2024-10-03 LAB
EST. AVERAGE GLUCOSE BLD GHB EST-MCNC: 123 MG/DL
HBA1C MFR BLD: 5.9 %

## 2024-10-16 ENCOUNTER — LAB (OUTPATIENT)
Dept: LAB | Facility: LAB | Age: 54
End: 2024-10-16
Payer: COMMERCIAL

## 2024-10-16 DIAGNOSIS — R79.9 ABNORMAL BLOOD CHEMISTRY: ICD-10-CM

## 2024-10-16 LAB
ERYTHROCYTE [DISTWIDTH] IN BLOOD BY AUTOMATED COUNT: 13.6 % (ref 11.5–14.5)
HCT VFR BLD AUTO: 41.1 % (ref 36–46)
HGB BLD-MCNC: 13.3 G/DL (ref 12–16)
MCH RBC QN AUTO: 30.2 PG (ref 26–34)
MCHC RBC AUTO-ENTMCNC: 32.4 G/DL (ref 32–36)
MCV RBC AUTO: 93 FL (ref 80–100)
NRBC BLD-RTO: 0 /100 WBCS (ref 0–0)
PLATELET # BLD AUTO: 206 X10*3/UL (ref 150–450)
RBC # BLD AUTO: 4.41 X10*6/UL (ref 4–5.2)
WBC # BLD AUTO: 4 X10*3/UL (ref 4.4–11.3)

## 2024-10-16 PROCEDURE — 36415 COLL VENOUS BLD VENIPUNCTURE: CPT

## 2024-10-16 PROCEDURE — 85027 COMPLETE CBC AUTOMATED: CPT

## 2024-10-17 DIAGNOSIS — R79.9 ABNORMAL BLOOD CHEMISTRY: Primary | ICD-10-CM

## 2024-11-07 ENCOUNTER — TELEPHONE (OUTPATIENT)
Dept: PRIMARY CARE | Facility: CLINIC | Age: 54
End: 2024-11-07
Payer: COMMERCIAL

## 2024-11-07 NOTE — TELEPHONE ENCOUNTER
Spoke to patient, appointment made for 11/8/24. Patient advised to go to ER if symptoms worsen. Patient expressed understanding and was in agreement with plan.

## 2024-11-08 ENCOUNTER — OFFICE VISIT (OUTPATIENT)
Dept: PRIMARY CARE | Facility: CLINIC | Age: 54
End: 2024-11-08
Payer: COMMERCIAL

## 2024-11-08 ENCOUNTER — LAB (OUTPATIENT)
Dept: LAB | Facility: LAB | Age: 54
End: 2024-11-08
Payer: COMMERCIAL

## 2024-11-08 VITALS
WEIGHT: 209.2 LBS | HEART RATE: 92 BPM | HEIGHT: 67 IN | BODY MASS INDEX: 32.83 KG/M2 | DIASTOLIC BLOOD PRESSURE: 70 MMHG | SYSTOLIC BLOOD PRESSURE: 106 MMHG | OXYGEN SATURATION: 94 % | TEMPERATURE: 97.8 F

## 2024-11-08 DIAGNOSIS — N93.8 DYSFUNCTIONAL UTERINE BLEEDING: ICD-10-CM

## 2024-11-08 DIAGNOSIS — N92.4 EXCESSIVE BLEEDING IN PREMENOPAUSAL PERIOD: Primary | ICD-10-CM

## 2024-11-08 DIAGNOSIS — N92.4 EXCESSIVE BLEEDING IN PREMENOPAUSAL PERIOD: ICD-10-CM

## 2024-11-08 LAB
BASOPHILS # BLD AUTO: 0.05 X10*3/UL (ref 0–0.1)
BASOPHILS NFR BLD AUTO: 1.1 %
EOSINOPHIL # BLD AUTO: 0.08 X10*3/UL (ref 0–0.7)
EOSINOPHIL NFR BLD AUTO: 1.8 %
ERYTHROCYTE [DISTWIDTH] IN BLOOD BY AUTOMATED COUNT: 13.2 % (ref 11.5–14.5)
HCT VFR BLD AUTO: 37.7 % (ref 36–46)
HGB BLD-MCNC: 12.4 G/DL (ref 12–16)
IMM GRANULOCYTES # BLD AUTO: 0 X10*3/UL (ref 0–0.7)
IMM GRANULOCYTES NFR BLD AUTO: 0 % (ref 0–0.9)
LYMPHOCYTES # BLD AUTO: 2.28 X10*3/UL (ref 1.2–4.8)
LYMPHOCYTES NFR BLD AUTO: 50.4 %
MCH RBC QN AUTO: 30.1 PG (ref 26–34)
MCHC RBC AUTO-ENTMCNC: 32.9 G/DL (ref 32–36)
MCV RBC AUTO: 92 FL (ref 80–100)
MONOCYTES # BLD AUTO: 0.41 X10*3/UL (ref 0.1–1)
MONOCYTES NFR BLD AUTO: 9.1 %
NEUTROPHILS # BLD AUTO: 1.7 X10*3/UL (ref 1.2–7.7)
NEUTROPHILS NFR BLD AUTO: 37.6 %
NRBC BLD-RTO: 0 /100 WBCS (ref 0–0)
PLATELET # BLD AUTO: 260 X10*3/UL (ref 150–450)
RBC # BLD AUTO: 4.12 X10*6/UL (ref 4–5.2)
WBC # BLD AUTO: 4.5 X10*3/UL (ref 4.4–11.3)

## 2024-11-08 PROCEDURE — 36415 COLL VENOUS BLD VENIPUNCTURE: CPT

## 2024-11-08 PROCEDURE — 85025 COMPLETE CBC W/AUTO DIFF WBC: CPT

## 2024-11-08 PROCEDURE — 3078F DIAST BP <80 MM HG: CPT | Performed by: PHYSICIAN ASSISTANT

## 2024-11-08 PROCEDURE — 3074F SYST BP LT 130 MM HG: CPT | Performed by: PHYSICIAN ASSISTANT

## 2024-11-08 PROCEDURE — 3008F BODY MASS INDEX DOCD: CPT | Performed by: PHYSICIAN ASSISTANT

## 2024-11-08 PROCEDURE — 99214 OFFICE O/P EST MOD 30 MIN: CPT | Performed by: PHYSICIAN ASSISTANT

## 2024-11-08 ASSESSMENT — PATIENT HEALTH QUESTIONNAIRE - PHQ9
SUM OF ALL RESPONSES TO PHQ9 QUESTIONS 1 AND 2: 0
1. LITTLE INTEREST OR PLEASURE IN DOING THINGS: NOT AT ALL
2. FEELING DOWN, DEPRESSED OR HOPELESS: NOT AT ALL

## 2024-11-08 NOTE — PROGRESS NOTES
"Subjective   Patient ID: Jud Colunga is a 53 y.o. female who presents for Vaginal Bleeding (2 weeks).    HPI  PT presents for evaluation of DUB. Pt states she has had her period for 2 weeks. PT states she went through a period of time where she was amenorrhea and thought she was in menopause for approximately 8 months but then she started having periods again. PT states periods were irregular and heavy at times. Pt reports heavy bleeding with clot formation - states having to change her pad/tampon every 45 minutes.   Pt does note clots approximately quarter size with minimal cramping.    Review of Systems  Constitutional: Negative.    HENT: Negative.     Respiratory: Negative.  Negative for cough, shortness of breath and wheezing.    Cardiovascular: Negative.  Negative for chest pain and palpitations.   Gastrointestinal: Negative.    Musculoskeletal: Negative.    Neurological: Negative.    Hematological: Negative.    Psychiatric/Behavioral: Negative.     All other systems reviewed and are negative.      Objective   /70 (BP Location: Left arm, Patient Position: Sitting, BP Cuff Size: Large adult)   Pulse 92   Temp 36.6 °C (97.8 °F) (Oral)   Ht 1.702 m (5' 7\")   Wt 94.9 kg (209 lb 3.2 oz)   LMP 10/25/2024 (Exact Date)   SpO2 94%   BMI 32.77 kg/m²     Physical Exam  Vitals and nursing note reviewed.   Constitutional:       Appearance: Normal appearance.   HENT:      Head: Normocephalic and atraumatic.      Mouth/Throat:      Mouth: Mucous membranes are moist.      Pharynx: Oropharynx is clear.   Eyes:      Conjunctiva/sclera: Conjunctivae normal.   Cardiovascular:      Rate and Rhythm: Normal rate and regular rhythm.      Heart sounds: Normal heart sounds.   Pulmonary:      Effort: Pulmonary effort is normal.      Breath sounds: Normal breath sounds.   Musculoskeletal:      Cervical back: No tenderness.   Skin:     General: Skin is warm and dry.      Coloration: Skin is not pale.      Findings: No " bruising.   Neurological:      General: No focal deficit present.      Mental Status: She is alert and oriented to person, place, and time.   Psychiatric:         Mood and Affect: Mood normal.         Behavior: Behavior normal.         Assessment/Plan  Pt presents for recent DUB/excessive vaginal bleeding. Will check CBC today - pt was encouraged with sx's to start OTC iron supplement and will refer for TV/TA pelvic ultrasound. PT was advised if sxs worsen  to go to the ER for evaluation - otherwise we will follow up once testing is reviewed.   Diagnoses and all orders for this visit:  Excessive bleeding in premenopausal period  -     CBC and Auto Differential; Future  -     US PELVIS TRANSABDOMINAL WITH TRANSVAGINAL; Future  Dysfunctional uterine bleeding  -     US PELVIS TRANSABDOMINAL WITH TRANSVAGINAL; Future

## 2024-11-15 ENCOUNTER — TELEPHONE (OUTPATIENT)
Dept: PRIMARY CARE | Facility: CLINIC | Age: 54
End: 2024-11-15
Payer: COMMERCIAL

## 2024-11-15 DIAGNOSIS — N93.8 DYSFUNCTIONAL UTERINE BLEEDING: Primary | ICD-10-CM

## 2024-11-15 NOTE — TELEPHONE ENCOUNTER
Please call pt and let her know her pelvic US testing showed a little thickening of her uterine lining but otherwise was unremarkable. I have placed a referral to gynecology services for consultation to discuss her sx's further.

## 2024-12-04 ENCOUNTER — APPOINTMENT (OUTPATIENT)
Dept: OBSTETRICS AND GYNECOLOGY | Facility: CLINIC | Age: 54
End: 2024-12-04
Payer: COMMERCIAL

## 2024-12-04 VITALS
BODY MASS INDEX: 33.27 KG/M2 | HEIGHT: 67 IN | SYSTOLIC BLOOD PRESSURE: 126 MMHG | DIASTOLIC BLOOD PRESSURE: 90 MMHG | WEIGHT: 212 LBS

## 2024-12-04 DIAGNOSIS — N93.8 DYSFUNCTIONAL UTERINE BLEEDING: ICD-10-CM

## 2024-12-04 PROCEDURE — 99214 OFFICE O/P EST MOD 30 MIN: CPT | Performed by: OBSTETRICS & GYNECOLOGY

## 2024-12-04 PROCEDURE — 58100 BIOPSY OF UTERUS LINING: CPT | Performed by: OBSTETRICS & GYNECOLOGY

## 2024-12-04 PROCEDURE — 1036F TOBACCO NON-USER: CPT | Performed by: OBSTETRICS & GYNECOLOGY

## 2024-12-04 PROCEDURE — 82670 ASSAY OF TOTAL ESTRADIOL: CPT

## 2024-12-04 PROCEDURE — 83001 ASSAY OF GONADOTROPIN (FSH): CPT

## 2024-12-04 PROCEDURE — 3008F BODY MASS INDEX DOCD: CPT | Performed by: OBSTETRICS & GYNECOLOGY

## 2024-12-04 PROCEDURE — 3074F SYST BP LT 130 MM HG: CPT | Performed by: OBSTETRICS & GYNECOLOGY

## 2024-12-04 PROCEDURE — 3080F DIAST BP >= 90 MM HG: CPT | Performed by: OBSTETRICS & GYNECOLOGY

## 2024-12-04 NOTE — LETTER
December 4, 2024     America Quiles PA-C  [address]  [facility] [address]    Patient: Jud Colunga   YOB: 1970   Date of Visit: 12/4/2024       Dear Dr. America Quiles PA-C:    Thank you for referring Jud Colunga to me for evaluation. Below are my notes for this consultation.  If you have questions, please do not hesitate to call me. I look forward to following your patient along with you.       Sincerely,     Baljinder Carballo,       CC: No Recipients  ______________________________________________________________________________________    Jud Colunga is a 54 y.o. female who presents with a chief complaint of Follow-up (Patient was referred for abnormal bleeding had ultrasound done and was told her Endometrial  lining is thick )      SUBJECTIVE  Patient presents complaining of dysfunctional bleeding.  She states she bled very heavy with lots of clotting.  She had blood for 6 months prior to that.  She denies any other problems at this time.  We discussed doing an endometrial biopsy.  She is currently without not bleeding    Past Medical History:   Diagnosis Date   • Anxiety    • Atrial flutter (Multi) 01/12/2019   • Chronic low back pain 07/06/2016   • Contact dermatitis 04/03/2024   • Cutaneous abscess of limb, unspecified 01/05/2022    Abscess, axilla   • Dizziness 07/15/2021   • Dysuria 04/03/2024   • GERD (gastroesophageal reflux disease)    • History of elevated lipids 04/03/2024   • Hypertension 04/03/2024   • Leukocytosis 04/03/2024   • Other abnormal glucose 09/15/2022    Elevated glucose   • Pain 12/26/2018   • Pain in left wrist 12/26/2018    Arthralgia of left wrist   • Pain in left wrist 12/26/2018   • Pain of left shoulder region 04/03/2024   • Personal history of diseases of the blood and blood-forming organs and certain disorders involving the immune mechanism 01/20/2021    History of leukocytosis   • Personal history of diseases of the skin and subcutaneous tissue  2022    History of cellulitis   • Personal history of diseases of the skin and subcutaneous tissue 2020    History of contact dermatitis   • Personal history of other diseases of the musculoskeletal system and connective tissue 2018    History of back pain   • Personal history of other endocrine, nutritional and metabolic disease 2018    History of hyperlipidemia   • Personal history of other specified conditions 2018    History of urinary frequency   • Personal history of other specified conditions 2021    History of vertigo   • Personal history of other specified conditions 2018    History of flank pain   • Strain of muscle and tendon of front wall of thorax, initial encounter 2021    Chest wall muscle strain   • Urinary tract infection, site not specified     Acute UTI     Past Surgical History:   Procedure Laterality Date   • MOUTH SURGERY  2023   • OTHER SURGICAL HISTORY  2019    Tubal ligation bilateral     Social History     Socioeconomic History   • Marital status:    Tobacco Use   • Smoking status: Never   • Smokeless tobacco: Never   Vaping Use   • Vaping status: Never Used   Substance and Sexual Activity   • Alcohol use: Not Currently     Comment: Special occasions   • Drug use: Never   • Sexual activity: Not Currently     Family History   Problem Relation Name Age of Onset   • Multiple myeloma Mother     • Stroke Father     • Heart attack Father     • Other (enlarged heart) Father     • Breast cancer Sister 6    • Heart disease Sister 6    • Diabetes type II Sister 6    • No Known Problems Brother 5    • Endometriosis Daughter 1    • No Known Problems Son 1    • Diabetes Maternal Grandmother     • Cancer Maternal Grandfather         OB History    Para Term  AB Living   2 2 2         SAB IAB Ectopic Multiple Live Births                  # Outcome Date GA Lbr Shayne/2nd Weight Sex Type Anes PTL Lv   2 Term            1 Term        "         OBJECTIVE  Allergies   Allergen Reactions   • Codeine Hallucinations      (Not in a hospital admission)       Review of Systems  History obtained from the patient  General ROS: negative  Psychological ROS: negative  Gastrointestinal ROS: no abdominal pain, change in bowel habits, or black or bloody stools  Musculoskeletal ROS: negative  Physical Exam  General Appearance: awake, alert, oriented, in no acute distress, well developed, well nourished, and in no acute distress  Skin: there are no suspicious lesions or rashes of concern, skin color, texture, turgor are normal; there are no bruises, rashes or lesions.  Head/Face: NCAT  Eyes: No gross abnormalities., PERRL, and EOMI  Neck: neck- supple, no mass, non-tender  Back: no pain to palpation  Abdomen: Soft, non-tender, normal bowel sounds; no bruits, organomegaly or masses.  Extremities: Extremities warm to touch, pink, with no edema.  Musculoskeletal: negative    /90   Ht 1.702 m (5' 7\")   Wt 96.2 kg (212 lb)   LMP 10/25/2024 (Exact Date)   BMI 33.20 kg/m²    Problem List Items Addressed This Visit    None  Visit Diagnoses       Dysfunctional uterine bleeding        Relevant Orders    Surgical Pathology Exam    TSH with reflex to Free T4 if abnormal    Estradiol    Follicle Stimulating Hormone         Follow up in 2 weeks        "

## 2024-12-04 NOTE — PROGRESS NOTES
Jud Colunga is a 54 y.o. female who presents with a chief complaint of Follow-up (Patient was referred for abnormal bleeding had ultrasound done and was told her Endometrial  lining is thick )      SUBJECTIVE  Patient presents complaining of dysfunctional bleeding.  She states she bled very heavy with lots of clotting.  She had blood for 6 months prior to that.  She denies any other problems at this time.  We discussed doing an endometrial biopsy.  She is currently without not bleeding    Past Medical History:   Diagnosis Date    Anxiety     Atrial flutter (Multi) 01/12/2019    Chronic low back pain 07/06/2016    Contact dermatitis 04/03/2024    Cutaneous abscess of limb, unspecified 01/05/2022    Abscess, axilla    Dizziness 07/15/2021    Dysuria 04/03/2024    GERD (gastroesophageal reflux disease)     History of elevated lipids 04/03/2024    Hypertension 04/03/2024    Leukocytosis 04/03/2024    Other abnormal glucose 09/15/2022    Elevated glucose    Pain 12/26/2018    Pain in left wrist 12/26/2018    Arthralgia of left wrist    Pain in left wrist 12/26/2018    Pain of left shoulder region 04/03/2024    Personal history of diseases of the blood and blood-forming organs and certain disorders involving the immune mechanism 01/20/2021    History of leukocytosis    Personal history of diseases of the skin and subcutaneous tissue 01/12/2022    History of cellulitis    Personal history of diseases of the skin and subcutaneous tissue 04/28/2020    History of contact dermatitis    Personal history of other diseases of the musculoskeletal system and connective tissue 09/05/2018    History of back pain    Personal history of other endocrine, nutritional and metabolic disease 09/25/2018    History of hyperlipidemia    Personal history of other specified conditions 08/08/2018    History of urinary frequency    Personal history of other specified conditions 08/21/2021    History of vertigo    Personal history of other  specified conditions 2018    History of flank pain    Strain of muscle and tendon of front wall of thorax, initial encounter 2021    Chest wall muscle strain    Urinary tract infection, site not specified     Acute UTI     Past Surgical History:   Procedure Laterality Date    MOUTH SURGERY  2023    OTHER SURGICAL HISTORY  2019    Tubal ligation bilateral     Social History     Socioeconomic History    Marital status:    Tobacco Use    Smoking status: Never    Smokeless tobacco: Never   Vaping Use    Vaping status: Never Used   Substance and Sexual Activity    Alcohol use: Not Currently     Comment: Special occasions    Drug use: Never    Sexual activity: Not Currently     Family History   Problem Relation Name Age of Onset    Multiple myeloma Mother      Stroke Father      Heart attack Father      Other (enlarged heart) Father      Breast cancer Sister 6     Heart disease Sister 6     Diabetes type II Sister 6     No Known Problems Brother 5     Endometriosis Daughter 1     No Known Problems Son 1     Diabetes Maternal Grandmother      Cancer Maternal Grandfather         OB History    Para Term  AB Living   2 2 2         SAB IAB Ectopic Multiple Live Births                  # Outcome Date GA Lbr Shayne/2nd Weight Sex Type Anes PTL Lv   2 Term            1 Term                OBJECTIVE  Allergies   Allergen Reactions    Codeine Hallucinations      (Not in a hospital admission)       Review of Systems  History obtained from the patient  General ROS: negative  Psychological ROS: negative  Gastrointestinal ROS: no abdominal pain, change in bowel habits, or black or bloody stools  Musculoskeletal ROS: negative  Physical Exam  General Appearance: awake, alert, oriented, in no acute distress, well developed, well nourished, and in no acute distress  Skin: there are no suspicious lesions or rashes of concern, skin color, texture, turgor are normal; there are no bruises, rashes or  "lesions.  Head/Face: NCAT  Eyes: No gross abnormalities., PERRL, and EOMI  Neck: neck- supple, no mass, non-tender  Back: no pain to palpation  Abdomen: Soft, non-tender, normal bowel sounds; no bruits, organomegaly or masses.  Extremities: Extremities warm to touch, pink, with no edema.  Musculoskeletal: negative    Patient ID: Jud Colunga is a 54 y.o. female.    Endometrial biopsy    Date/Time: 12/4/2024 11:37 AM    Performed by: Baljinder Carballo DO  Authorized by: Baljinder Carballo DO    Consent:     Consent obtained: written    Consent given by: patient  Indications:     Indications: abnormal bleeding from female genital tract    Procedure:     A bimanual exam was performed: yes      Uterus size: non-gravid    Prepped with: Betadine    Tenaculum used: yes      Cervix dilated: no      Number of passes: 2  Findings:     Cervix: normal      Specimen collected: specimen collected and sent to pathology      Patient tolerance: tolerated well, no immediate complications      /90   Ht 1.702 m (5' 7\")   Wt 96.2 kg (212 lb)   LMP 10/25/2024 (Exact Date)   BMI 33.20 kg/m²    Problem List Items Addressed This Visit    None  Visit Diagnoses       Dysfunctional uterine bleeding        Relevant Orders    Surgical Pathology Exam    TSH with reflex to Free T4 if abnormal    Estradiol    Follicle Stimulating Hormone         Follow up in 2 weeks        "

## 2024-12-05 LAB
ESTRADIOL SERPL-MCNC: <19 PG/ML
FSH SERPL-ACNC: 45 IU/L
TSH SERPL-ACNC: 1.7 MIU/L (ref 0.44–3.98)

## 2024-12-17 LAB
LABORATORY COMMENT REPORT: NORMAL
PATH REPORT.COMMENTS IMP SPEC: NORMAL
PATH REPORT.FINAL DX SPEC: NORMAL
PATH REPORT.GROSS SPEC: NORMAL
PATH REPORT.RELEVANT HX SPEC: NORMAL
PATH REPORT.TOTAL CANCER: NORMAL

## 2024-12-18 ENCOUNTER — APPOINTMENT (OUTPATIENT)
Dept: OBSTETRICS AND GYNECOLOGY | Facility: CLINIC | Age: 54
End: 2024-12-18
Payer: COMMERCIAL

## 2024-12-18 VITALS
WEIGHT: 213 LBS | DIASTOLIC BLOOD PRESSURE: 88 MMHG | HEIGHT: 67 IN | SYSTOLIC BLOOD PRESSURE: 130 MMHG | BODY MASS INDEX: 33.43 KG/M2

## 2024-12-18 DIAGNOSIS — N95.0 PMB (POSTMENOPAUSAL BLEEDING): Primary | ICD-10-CM

## 2024-12-18 PROCEDURE — 99214 OFFICE O/P EST MOD 30 MIN: CPT | Performed by: OBSTETRICS & GYNECOLOGY

## 2024-12-18 PROCEDURE — 1036F TOBACCO NON-USER: CPT | Performed by: OBSTETRICS & GYNECOLOGY

## 2024-12-18 PROCEDURE — 3008F BODY MASS INDEX DOCD: CPT | Performed by: OBSTETRICS & GYNECOLOGY

## 2024-12-18 PROCEDURE — 3075F SYST BP GE 130 - 139MM HG: CPT | Performed by: OBSTETRICS & GYNECOLOGY

## 2024-12-18 PROCEDURE — 3079F DIAST BP 80-89 MM HG: CPT | Performed by: OBSTETRICS & GYNECOLOGY

## 2024-12-18 NOTE — PROGRESS NOTES
Jud Colunga is a 54 y.o. female who presents with a chief complaint of Follow-up (Results )      SUBJECTIVE  Patient presents to go over her endometrial biopsy.  It was negative.  Her FSH was greater than 45 which is menopausal.  She is not currently bleeding anymore.  We talked about postmenopausal length.  She is in a follow-up as needed    Past Medical History:   Diagnosis Date    Anxiety     Atrial flutter (Multi) 01/12/2019    Chronic low back pain 07/06/2016    Contact dermatitis 04/03/2024    Cutaneous abscess of limb, unspecified 01/05/2022    Abscess, axilla    Dizziness 07/15/2021    Dysuria 04/03/2024    GERD (gastroesophageal reflux disease)     History of elevated lipids 04/03/2024    Hypertension 04/03/2024    Leukocytosis 04/03/2024    Other abnormal glucose 09/15/2022    Elevated glucose    Pain 12/26/2018    Pain in left wrist 12/26/2018    Arthralgia of left wrist    Pain in left wrist 12/26/2018    Pain of left shoulder region 04/03/2024    Personal history of diseases of the blood and blood-forming organs and certain disorders involving the immune mechanism 01/20/2021    History of leukocytosis    Personal history of diseases of the skin and subcutaneous tissue 01/12/2022    History of cellulitis    Personal history of diseases of the skin and subcutaneous tissue 04/28/2020    History of contact dermatitis    Personal history of other diseases of the musculoskeletal system and connective tissue 09/05/2018    History of back pain    Personal history of other endocrine, nutritional and metabolic disease 09/25/2018    History of hyperlipidemia    Personal history of other specified conditions 08/08/2018    History of urinary frequency    Personal history of other specified conditions 08/21/2021    History of vertigo    Personal history of other specified conditions 08/20/2018    History of flank pain    Strain of muscle and tendon of front wall of thorax, initial encounter 08/21/2021    Chest wall  muscle strain    Urinary tract infection, site not specified     Acute UTI     Past Surgical History:   Procedure Laterality Date    MOUTH SURGERY  2023    OTHER SURGICAL HISTORY  2019    Tubal ligation bilateral     Social History     Socioeconomic History    Marital status:    Tobacco Use    Smoking status: Never    Smokeless tobacco: Never   Vaping Use    Vaping status: Never Used   Substance and Sexual Activity    Alcohol use: Not Currently     Comment: Special occasions    Drug use: Never    Sexual activity: Not Currently     Family History   Problem Relation Name Age of Onset    Multiple myeloma Mother      Stroke Father      Heart attack Father      Other (enlarged heart) Father      Breast cancer Sister 6     Heart disease Sister 6     Diabetes type II Sister 6     No Known Problems Brother 5     Endometriosis Daughter 1     No Known Problems Son 1     Diabetes Maternal Grandmother      Cancer Maternal Grandfather         OB History    Para Term  AB Living   2 2 2         SAB IAB Ectopic Multiple Live Births                  # Outcome Date GA Lbr Shayne/2nd Weight Sex Type Anes PTL Lv   2 Term            1 Term                OBJECTIVE  Allergies   Allergen Reactions    Codeine Hallucinations      (Not in a hospital admission)       Review of Systems  History obtained from the patient  General ROS: negative  Psychological ROS: negative  Gastrointestinal ROS: no abdominal pain, change in bowel habits, or black or bloody stools  Musculoskeletal ROS: negative  Physical Exam  General Appearance: awake, alert, oriented, in no acute distress, well developed, well nourished, and in no acute distress  Skin: there are no suspicious lesions or rashes of concern, skin color, texture, turgor are normal; there are no bruises, rashes or lesions.  Head/Face: NCAT  Eyes: No gross abnormalities., PERRL, and EOMI  Neck: neck- supple, no mass, non-tender  Back: no pain to palpation  Abdomen: Soft,  "non-tender, normal bowel sounds; no bruits, organomegaly or masses.  Extremities: Extremities warm to touch, pink, with no edema.  Musculoskeletal: negative    /88   Ht 1.702 m (5' 7\")   Wt 96.6 kg (213 lb)   BMI 33.36 kg/m²    Problem List Items Addressed This Visit    None  Visit Diagnoses       PMB (postmenopausal bleeding)    -  Primary         Follow up as needed      "

## 2025-01-03 ENCOUNTER — APPOINTMENT (OUTPATIENT)
Dept: PRIMARY CARE | Facility: CLINIC | Age: 55
End: 2025-01-03
Payer: COMMERCIAL

## 2025-01-03 ENCOUNTER — LAB (OUTPATIENT)
Dept: LAB | Facility: LAB | Age: 55
End: 2025-01-03
Payer: COMMERCIAL

## 2025-01-03 VITALS
BODY MASS INDEX: 34.06 KG/M2 | DIASTOLIC BLOOD PRESSURE: 76 MMHG | SYSTOLIC BLOOD PRESSURE: 126 MMHG | OXYGEN SATURATION: 95 % | WEIGHT: 217 LBS | HEART RATE: 83 BPM | TEMPERATURE: 98.2 F | HEIGHT: 67 IN

## 2025-01-03 DIAGNOSIS — F41.9 ANXIETY: ICD-10-CM

## 2025-01-03 DIAGNOSIS — R73.9 HYPERGLYCEMIA: ICD-10-CM

## 2025-01-03 DIAGNOSIS — E78.5 HYPERLIPIDEMIA, UNSPECIFIED HYPERLIPIDEMIA TYPE: Primary | ICD-10-CM

## 2025-01-03 DIAGNOSIS — K21.9 GASTROESOPHAGEAL REFLUX DISEASE, UNSPECIFIED WHETHER ESOPHAGITIS PRESENT: ICD-10-CM

## 2025-01-03 DIAGNOSIS — Z12.31 BREAST CANCER SCREENING BY MAMMOGRAM: ICD-10-CM

## 2025-01-03 DIAGNOSIS — E55.9 VITAMIN D DEFICIENCY: ICD-10-CM

## 2025-01-03 DIAGNOSIS — I48.0 PAROXYSMAL ATRIAL FIBRILLATION (MULTI): ICD-10-CM

## 2025-01-03 LAB
ANION GAP SERPL CALC-SCNC: 13 MMOL/L (ref 10–20)
BUN SERPL-MCNC: 10 MG/DL (ref 6–23)
CALCIUM SERPL-MCNC: 9.3 MG/DL (ref 8.6–10.3)
CHLORIDE SERPL-SCNC: 105 MMOL/L (ref 98–107)
CO2 SERPL-SCNC: 26 MMOL/L (ref 21–32)
CREAT SERPL-MCNC: 0.66 MG/DL (ref 0.5–1.05)
CREAT UR-MCNC: 140.4 MG/DL (ref 20–320)
EGFRCR SERPLBLD CKD-EPI 2021: >90 ML/MIN/1.73M*2
EST. AVERAGE GLUCOSE BLD GHB EST-MCNC: 114 MG/DL
GLUCOSE SERPL-MCNC: 90 MG/DL (ref 74–99)
HBA1C MFR BLD: 5.6 %
MICROALBUMIN UR-MCNC: <7 MG/L
MICROALBUMIN/CREAT UR: NORMAL MG/G{CREAT}
POTASSIUM SERPL-SCNC: 4.2 MMOL/L (ref 3.5–5.3)
SODIUM SERPL-SCNC: 140 MMOL/L (ref 136–145)

## 2025-01-03 PROCEDURE — 3074F SYST BP LT 130 MM HG: CPT | Performed by: PHYSICIAN ASSISTANT

## 2025-01-03 PROCEDURE — 3078F DIAST BP <80 MM HG: CPT | Performed by: PHYSICIAN ASSISTANT

## 2025-01-03 PROCEDURE — 83036 HEMOGLOBIN GLYCOSYLATED A1C: CPT

## 2025-01-03 PROCEDURE — 82043 UR ALBUMIN QUANTITATIVE: CPT

## 2025-01-03 PROCEDURE — 1036F TOBACCO NON-USER: CPT | Performed by: PHYSICIAN ASSISTANT

## 2025-01-03 PROCEDURE — 99213 OFFICE O/P EST LOW 20 MIN: CPT | Performed by: PHYSICIAN ASSISTANT

## 2025-01-03 PROCEDURE — 80048 BASIC METABOLIC PNL TOTAL CA: CPT

## 2025-01-03 PROCEDURE — 82570 ASSAY OF URINE CREATININE: CPT

## 2025-01-03 PROCEDURE — 3008F BODY MASS INDEX DOCD: CPT | Performed by: PHYSICIAN ASSISTANT

## 2025-01-03 RX ORDER — OMEPRAZOLE 20 MG/1
20 CAPSULE, DELAYED RELEASE ORAL DAILY
Qty: 90 CAPSULE | Refills: 0 | Status: SHIPPED | OUTPATIENT
Start: 2025-01-03 | End: 2025-04-03

## 2025-01-03 RX ORDER — ACETAMINOPHEN 500 MG
2000 TABLET ORAL DAILY
Qty: 90 CAPSULE | Refills: 0 | Status: SHIPPED | OUTPATIENT
Start: 2025-01-03 | End: 2025-04-03

## 2025-01-03 RX ORDER — ATORVASTATIN CALCIUM 20 MG/1
20 TABLET, FILM COATED ORAL DAILY
Qty: 90 TABLET | Refills: 0 | Status: SHIPPED | OUTPATIENT
Start: 2025-01-03 | End: 2025-04-03

## 2025-01-03 RX ORDER — METFORMIN HYDROCHLORIDE 500 MG/1
500 TABLET, EXTENDED RELEASE ORAL
Qty: 90 TABLET | Refills: 0 | Status: SHIPPED | OUTPATIENT
Start: 2025-01-03 | End: 2025-04-03

## 2025-01-03 RX ORDER — DILTIAZEM HYDROCHLORIDE 120 MG/1
120 CAPSULE, COATED, EXTENDED RELEASE ORAL DAILY
Qty: 90 CAPSULE | Refills: 0 | Status: SHIPPED | OUTPATIENT
Start: 2025-01-03 | End: 2025-04-03

## 2025-01-03 RX ORDER — SERTRALINE HYDROCHLORIDE 100 MG/1
200 TABLET, FILM COATED ORAL DAILY
Qty: 180 TABLET | Refills: 0 | Status: SHIPPED | OUTPATIENT
Start: 2025-01-03 | End: 2025-04-03

## 2025-01-03 NOTE — PROGRESS NOTES
"Subjective   Patient ID: Jud Colunga is a 54 y.o. female who presents for Med Refill.    HPI: Pt is here for refills of medications to treat the following medical conditions. Unless otherwise stated, these conditions are well-controlled, pt is taking medications s Rx, and there are no reported side effects to medications or other treatment: Anxiety, GERD, hyperglycemia, hyperlipidemia, Paroxysmal atrial fibrillation, Vitamin D Deficiency.     PT reports she followed with Dr Carballo for DUB - biopsy was completed and unremarkable. PT is no longer having DUB and was recommended to follow with Dr. Carballo in 6 mo. PT plans to continue with follow up plan.     Review of Systems  Constitutional: Negative.    HENT: Negative.     Respiratory: Negative.  Negative for cough, shortness of breath and wheezing.    Cardiovascular: Negative.  Negative for chest pain and palpitations.   Gastrointestinal: Negative.    Musculoskeletal: Negative.    Neurological: Negative.    Hematological: Negative.    Psychiatric/Behavioral: Negative.     All other systems reviewed and are negative.      Objective   /76 (BP Location: Left arm, Patient Position: Sitting, BP Cuff Size: Large adult)   Pulse 83   Temp 36.8 °C (98.2 °F) (Oral)   Ht 1.702 m (5' 7\")   Wt 98.4 kg (217 lb)   LMP 11/21/2024 (Approximate)   SpO2 95%   BMI 33.99 kg/m²     Physical Exam  Vitals and nursing note reviewed.   Constitutional:       General Appearance: Normal appearance, no distress, not ill-appearing.   HENT:      Head: Normocephalic and atraumatic.      Mouth/Throat:  MMM, Oropharynx is clear without erythema or exudate  Eyes:      Conjunctiva/sclera: Conjunctivae normal.   Cardiovascular:      Normal rate and regular rhythm: Normal heart sounds.   Pulmonary:      Pulmonary effort is normal. Normal breath sounds. No wheezing.   Musculoskeletal:         General: Normal range of motion.    Lymphadenopathy:      Cervical:  Supple, non-tender, no cervical " adenopathy.   Skin:     General: Skin is warm and dry, no rash or jaundice.    Neurological:      No focal deficit present. Alert and oriented to person, place, and time.   Psychiatric:         Mood and Affect: Mood normal.         Behavior: Behavior normal.         Thought Content: Thought content normal.         Judgment: Judgment normal.     Assessment/Plan Jud Colunga presents for chronic medication refills without complaint.  She appears to be doing well - her exam was unremarkable. Will renew chronic medication(s) without changes and check labs. She will be due for mammogram testing in April - orders placed. She will follow up if necessary once labs are reviewed otherwise we will see her back when she is  due for refills - sooner if she has  any complications.    Diagnoses and all orders for this visit:  Hyperlipidemia, unspecified hyperlipidemia type  -     atorvastatin (Lipitor) 20 mg tablet; Take 1 tablet (20 mg) by mouth once daily. as directed.   Appointment needed for refills.  Vitamin D deficiency  -     cholecalciferol (Vitamin D3) 50 mcg (2,000 unit) capsule; Take 1 capsule (50 mcg) by mouth once daily.  Paroxysmal atrial fibrillation (Multi)  -     dilTIAZem CD (Cardizem CD) 120 mg 24 hr capsule; Take 1 capsule (120 mg) by mouth once daily.  Hyperglycemia  -     Basic Metabolic Panel; Future  -     Hemoglobin A1C; Future  -     Albumin-Creatinine Ratio, Urine Random; Future  -     metFORMIN XR (Glucophage-XR) 500 mg 24 hr tablet; Take 1 tablet (500 mg) by mouth once daily with breakfast. Do not crush, chew, or split.  Gastroesophageal reflux disease, unspecified whether esophagitis present  -     omeprazole (PriLOSEC) 20 mg DR capsule; Take 1 capsule (20 mg) by mouth once daily.  Anxiety  -     sertraline (Zoloft) 100 mg tablet; Take 2 tablets (200 mg) by mouth once daily.  Breast cancer screening by mammogram  -     BI mammo bilateral screening tomosynthesis; Future

## 2025-02-19 NOTE — Clinical Note
***HELP TEXT***  This letter is meant to display your personal SmartPhrase that you would like to send to patients.     1. Create a new SmartPhrase with the name MyPatientLetter.   2. Format the letter any way you want. Your organizational header will already be included so you can leave that out.  3. Save your SmartPhrase. If you get a warning about the name being used, ignore it.  4. Use the My Patient Letter template in the Communications section. This will send your letter!    
Additional Notes: Patient declines filling out MIPS Bi-annual questionnaire form 2/2025.
Detail Level: Generalized

## 2025-04-02 ENCOUNTER — APPOINTMENT (OUTPATIENT)
Dept: PRIMARY CARE | Facility: CLINIC | Age: 55
End: 2025-04-02
Payer: COMMERCIAL

## 2025-04-02 VITALS
SYSTOLIC BLOOD PRESSURE: 128 MMHG | TEMPERATURE: 98.2 F | HEIGHT: 67 IN | WEIGHT: 214.4 LBS | HEART RATE: 83 BPM | DIASTOLIC BLOOD PRESSURE: 74 MMHG | OXYGEN SATURATION: 95 % | BODY MASS INDEX: 33.65 KG/M2

## 2025-04-02 DIAGNOSIS — I48.0 PAROXYSMAL ATRIAL FIBRILLATION (MULTI): ICD-10-CM

## 2025-04-02 DIAGNOSIS — K21.9 GASTROESOPHAGEAL REFLUX DISEASE, UNSPECIFIED WHETHER ESOPHAGITIS PRESENT: ICD-10-CM

## 2025-04-02 DIAGNOSIS — E55.9 VITAMIN D DEFICIENCY: ICD-10-CM

## 2025-04-02 DIAGNOSIS — R73.9 HYPERGLYCEMIA: ICD-10-CM

## 2025-04-02 DIAGNOSIS — F41.9 ANXIETY: ICD-10-CM

## 2025-04-02 DIAGNOSIS — E78.5 HYPERLIPIDEMIA, UNSPECIFIED HYPERLIPIDEMIA TYPE: ICD-10-CM

## 2025-04-02 PROCEDURE — 99213 OFFICE O/P EST LOW 20 MIN: CPT | Performed by: PHYSICIAN ASSISTANT

## 2025-04-02 PROCEDURE — 3008F BODY MASS INDEX DOCD: CPT | Performed by: PHYSICIAN ASSISTANT

## 2025-04-02 PROCEDURE — 3078F DIAST BP <80 MM HG: CPT | Performed by: PHYSICIAN ASSISTANT

## 2025-04-02 PROCEDURE — 3074F SYST BP LT 130 MM HG: CPT | Performed by: PHYSICIAN ASSISTANT

## 2025-04-02 PROCEDURE — 1036F TOBACCO NON-USER: CPT | Performed by: PHYSICIAN ASSISTANT

## 2025-04-02 RX ORDER — SERTRALINE HYDROCHLORIDE 100 MG/1
200 TABLET, FILM COATED ORAL DAILY
Qty: 180 TABLET | Refills: 0 | Status: SHIPPED | OUTPATIENT
Start: 2025-04-02 | End: 2025-07-01

## 2025-04-02 RX ORDER — ACETAMINOPHEN 500 MG
2000 TABLET ORAL DAILY
Qty: 90 CAPSULE | Refills: 0 | Status: SHIPPED | OUTPATIENT
Start: 2025-04-02 | End: 2025-07-01

## 2025-04-02 RX ORDER — DILTIAZEM HYDROCHLORIDE 120 MG/1
120 CAPSULE, COATED, EXTENDED RELEASE ORAL DAILY
Qty: 90 CAPSULE | Refills: 0 | Status: SHIPPED | OUTPATIENT
Start: 2025-04-02 | End: 2025-07-01

## 2025-04-02 RX ORDER — METFORMIN HYDROCHLORIDE 500 MG/1
500 TABLET, EXTENDED RELEASE ORAL
Qty: 90 TABLET | Refills: 0 | Status: SHIPPED | OUTPATIENT
Start: 2025-04-02 | End: 2025-07-01

## 2025-04-02 RX ORDER — OMEPRAZOLE 20 MG/1
20 CAPSULE, DELAYED RELEASE ORAL DAILY
Qty: 90 CAPSULE | Refills: 0 | Status: SHIPPED | OUTPATIENT
Start: 2025-04-02 | End: 2025-07-01

## 2025-04-02 RX ORDER — ATORVASTATIN CALCIUM 20 MG/1
20 TABLET, FILM COATED ORAL DAILY
Qty: 90 TABLET | Refills: 0 | Status: SHIPPED | OUTPATIENT
Start: 2025-04-02 | End: 2025-07-01

## 2025-04-02 ASSESSMENT — PATIENT HEALTH QUESTIONNAIRE - PHQ9
2. FEELING DOWN, DEPRESSED OR HOPELESS: NOT AT ALL
SUM OF ALL RESPONSES TO PHQ9 QUESTIONS 1 AND 2: 0
1. LITTLE INTEREST OR PLEASURE IN DOING THINGS: NOT AT ALL

## 2025-04-02 NOTE — PROGRESS NOTES
"Subjective   Patient ID: Jud Colunga is a 54 y.o. female who presents for Med Refill.    HPI Pt is here for refills of medications to treat the following medical conditions. Unless otherwise stated, these conditions are well-controlled, pt is taking medications s Rx, and there are no reported side effects to medications or other treatment: anxiety, GERD, Hyperglycemia, hyperlipidemia, Paroxysmal atrial fibrillation, Vitamin D Deficiency    Review of Systems  Constitutional: Negative.    HENT: Negative.     Respiratory: Negative.  Negative for cough, shortness of breath and wheezing.    Cardiovascular: Negative.  Negative for chest pain and palpitations.   Gastrointestinal: Negative.    Musculoskeletal: Negative.    Neurological: Negative.    Hematological: Negative.    Psychiatric/Behavioral: Negative.     All other systems reviewed and are negative.    Objective   /74 (BP Location: Left arm, Patient Position: Sitting, BP Cuff Size: Large adult)   Pulse 83   Temp 36.8 °C (98.2 °F) (Oral)   Ht 1.702 m (5' 7\")   Wt 97.3 kg (214 lb 6.4 oz)   SpO2 95%   BMI 33.58 kg/m²     Physical Exam  Vitals and nursing note reviewed.   Constitutional:       General Appearance: Normal appearance, no distress, not ill-appearing.   HENT:      Head: Normocephalic and atraumatic.      Mouth/Throat:  MMM, Oropharynx is clear without erythema or exudate  Eyes:      Conjunctiva/sclera: Conjunctivae normal.   Cardiovascular:      Normal rate and regular rhythm: Normal heart sounds.   Pulmonary:      Pulmonary effort is normal. Normal breath sounds. No wheezing.   Abdominal:      General: Bowel sounds are normal. Abdomen is soft, non- tender, no masses.  Musculoskeletal:         General: Normal range of motion.    Lymphadenopathy:      Cervical:  Supple, non-tender, no cervical adenopathy.   Skin:     General: Skin is warm and dry, no rash or jaundice.    Neurological:      No focal deficit present. Alert and oriented to person, " place, and time.   Psychiatric:         Mood and Affect: Mood normal.         Behavior: Behavior normal.         Thought Content: Thought content normal.         Judgment: Judgment normal.      Assessment/Plan   Jud Colunga presents for chronic medication refills without complaint.  She appears to be doing well - her exam was unremarkable. Will renew chronic medication(s) without changes and check labs. She will follow up if necessary once labs are reviewed otherwise we will see her back when she is  due for refills - sooner if she has  any complications.  PT reports on 4/28 she is scheduled for her mammogram - will follow up once reviewed.     Diagnoses and all orders for this visit:  Hyperlipidemia, unspecified hyperlipidemia type  -     Lipid Panel; Future  -     Comprehensive Metabolic Panel; Future  -     atorvastatin (Lipitor) 20 mg tablet; Take 1 tablet (20 mg) by mouth once daily. as directed.   Appointment needed for refills.  Vitamin D deficiency  -     cholecalciferol (Vitamin D3) 50 mcg (2,000 unit) capsule; Take 1 capsule (50 mcg) by mouth once daily.  Paroxysmal atrial fibrillation (Multi)  -     dilTIAZem CD (Cardizem CD) 120 mg 24 hr capsule; Take 1 capsule (120 mg) by mouth once daily.  Hyperglycemia  -     Hemoglobin A1C; Future  -     metFORMIN XR (Glucophage-XR) 500 mg 24 hr tablet; Take 1 tablet (500 mg) by mouth once daily with breakfast. Do not crush, chew, or split.  Gastroesophageal reflux disease, unspecified whether esophagitis present  -     omeprazole (PriLOSEC) 20 mg DR capsule; Take 1 capsule (20 mg) by mouth once daily.  Anxiety  -     sertraline (Zoloft) 100 mg tablet; Take 2 tablets (200 mg) by mouth once daily.

## 2025-04-03 LAB
ALBUMIN SERPL-MCNC: 4.7 G/DL (ref 3.6–5.1)
ALP SERPL-CCNC: 99 U/L (ref 37–153)
ALT SERPL-CCNC: 14 U/L (ref 6–29)
ANION GAP SERPL CALCULATED.4IONS-SCNC: 11 MMOL/L (CALC) (ref 7–17)
AST SERPL-CCNC: 28 U/L (ref 10–35)
BILIRUB SERPL-MCNC: 0.5 MG/DL (ref 0.2–1.2)
BUN SERPL-MCNC: 12 MG/DL (ref 7–25)
CALCIUM SERPL-MCNC: 9.6 MG/DL (ref 8.6–10.4)
CHLORIDE SERPL-SCNC: 105 MMOL/L (ref 98–110)
CHOLEST SERPL-MCNC: 208 MG/DL
CHOLEST/HDLC SERPL: 2.4 (CALC)
CO2 SERPL-SCNC: 22 MMOL/L (ref 20–32)
CREAT SERPL-MCNC: 0.62 MG/DL (ref 0.5–1.03)
EGFRCR SERPLBLD CKD-EPI 2021: 104 ML/MIN/1.73M2
EST. AVERAGE GLUCOSE BLD GHB EST-MCNC: 123 MG/DL
EST. AVERAGE GLUCOSE BLD GHB EST-SCNC: 6.8 MMOL/L
GLUCOSE SERPL-MCNC: 87 MG/DL (ref 65–99)
HBA1C MFR BLD: 5.9 % OF TOTAL HGB
HDLC SERPL-MCNC: 87 MG/DL
LDLC SERPL CALC-MCNC: 106 MG/DL (CALC)
NONHDLC SERPL-MCNC: 121 MG/DL (CALC)
POTASSIUM SERPL-SCNC: 4.3 MMOL/L (ref 3.5–5.3)
PROT SERPL-MCNC: 7.4 G/DL (ref 6.1–8.1)
SODIUM SERPL-SCNC: 138 MMOL/L (ref 135–146)
TRIGL SERPL-MCNC: 66 MG/DL

## 2025-04-28 ENCOUNTER — HOSPITAL ENCOUNTER (OUTPATIENT)
Dept: RADIOLOGY | Facility: CLINIC | Age: 55
Discharge: HOME | End: 2025-04-28
Payer: COMMERCIAL

## 2025-04-28 DIAGNOSIS — Z12.31 BREAST CANCER SCREENING BY MAMMOGRAM: ICD-10-CM

## 2025-04-28 PROCEDURE — 77067 SCR MAMMO BI INCL CAD: CPT

## 2025-04-28 PROCEDURE — 77067 SCR MAMMO BI INCL CAD: CPT | Performed by: RADIOLOGY

## 2025-04-28 PROCEDURE — 77063 BREAST TOMOSYNTHESIS BI: CPT | Performed by: RADIOLOGY

## 2025-04-30 DIAGNOSIS — R92.8 ABNORMAL MAMMOGRAM OF RIGHT BREAST: Primary | ICD-10-CM

## 2025-04-30 NOTE — PROGRESS NOTES
Orders placed for diagnostic mammogram R breast following  abnormal imaging - PT notified by staff.

## 2025-05-12 ENCOUNTER — HOSPITAL ENCOUNTER (OUTPATIENT)
Dept: RADIOLOGY | Facility: CLINIC | Age: 55
Discharge: HOME | End: 2025-05-12
Payer: COMMERCIAL

## 2025-05-12 DIAGNOSIS — R92.8 ABNORMAL MAMMOGRAM OF RIGHT BREAST: ICD-10-CM

## 2025-05-12 PROCEDURE — 77065 DX MAMMO INCL CAD UNI: CPT | Mod: RT

## 2025-05-12 PROCEDURE — 77065 DX MAMMO INCL CAD UNI: CPT | Mod: RIGHT SIDE | Performed by: STUDENT IN AN ORGANIZED HEALTH CARE EDUCATION/TRAINING PROGRAM

## 2025-05-13 DIAGNOSIS — R92.8 ABNORMAL MAMMOGRAM: ICD-10-CM

## 2025-05-27 ENCOUNTER — APPOINTMENT (OUTPATIENT)
Dept: HEMATOLOGY/ONCOLOGY | Facility: HOSPITAL | Age: 55
End: 2025-05-27
Payer: COMMERCIAL

## 2025-05-27 PROBLEM — R92.1 BREAST CALCIFICATIONS ON MAMMOGRAM: Status: ACTIVE | Noted: 2025-05-27

## 2025-05-27 PROBLEM — R92.8 ABNORMAL FINDING ON BREAST IMAGING: Status: ACTIVE | Noted: 2025-05-27

## 2025-05-27 NOTE — PROGRESS NOTES
Jud Colunga female   1970 54 y.o.   [id number]      Chief Complaint    New Patient Visit          HPI  Jud Colunga is a 54 y.o. female [facility] by [facility]iles [facility]-C [facility] detected right breast calcifications, biopsy consult. She denies breast surgery or biopsy. She has family history of breast cancer in her sister in her 30s.     She has not noted any breast masses or nodules, skin or nipple changes, nipple discharge, or axillary lymphadenopathy bilaterally.      BREAST IMAGIN25 Bilateral screening mammogram   FINDINGS:  2D and tomosynthesis images were reviewed at 1 mm slice thickness.      Density:  There are scattered areas of fibroglandular density.      There are microcalcifications in the central right breast at anterior  depth. These may be vascular. Stable bilateral circumscribed masses.  No suspicious masses or calcifications are identified in the left  breast.      IMPRESSION:  Right breast microcalcifications. Spot magnification views and a true  lateral view with tomosynthesis recommended. These may be vascular.      No evidence of malignancy left breast.      BI-RADS CATEGORY:  BI-RADS Category:  0 Incomplete; Need Additional Imaging Evaluation  Recommendation:  Additional Imaging.  Recommended Date:  Immediate.  Laterality:  Right.      25 Right diagnostic mammogram   Spot magnification views of the right breast demonstrate linear  oriented fine pleomorphic calcifications measuring up to 2.2 cm seen  in the inferior lateral right breast at anterior depth. Additionally,  fine linear calcifications are visualized focally anteromedially and  posterolaterally from the aforementioned calcifications. No  suspicious masses are identified.      IMPRESSION:  Indeterminate right breast calcifications. Further evaluation with  surgical consultation and stereotactic-guided biopsy is recommended.      Please note that there are additional calcifications marked on  the  imaging within 1-2 cm of the indeterminate grouping that may require  additional biopsy and/or localization based on pathology.      BI-RADS CATEGORY:  BI-RADS Category:  4 Suspicious.  Recommendation:  Surgical Consultation and Biopsy.  Recommended Date:  Immediate.  Laterality:  Right.      REPRODUCTIVE HISTORY: menarche age 12, , first birth age 14, no breastfeeding, no OCPs, premenopausal (LMP 23), no HRT or fertility meds    FAMILY CANCER HISTORY:   Mother with multiple myeloma at age 67,  of disease  Sister with breast cancer in her 30s, alive in her 60s      REVIEW OF SYSTEMS    Constitutional:  Negative for appetite change, fatigue, fever and unexpected weight change.   HENT:  Negative for ear pain, hearing loss, nosebleeds, sore throat and trouble swallowing.    Eyes:  Negative for discharge, itching and visual disturbance.   Respiratory:  Negative for cough, chest tightness and shortness of breath.    Cardiovascular:  Negative for chest pain, palpitations and leg swelling.   Breast: as indicated in HPI  Gastrointestinal:  Negative for abdominal pain, constipation, diarrhea and nausea.   Endocrine: Negative for cold intolerance and heat intolerance.   Genitourinary:  Negative for dysuria, frequency, hematuria, pelvic pain and vaginal bleeding.   Musculoskeletal:  Negative for arthralgias, back pain, gait problem, joint swelling and myalgias.   Skin:  Negative for color change and rash.   Allergic/Immunologic: Negative for environmental allergies and food allergies.   Neurological:  Negative for dizziness, tremors, speech difficulty, weakness, numbness and headaches.   Hematological:  Does not bruise/bleed easily.   Psychiatric/Behavioral:  Negative for agitation, dysphoric mood and sleep disturbance. The patient is not nervous/anxious.         MEDICATIONS  Current Outpatient Medications   Medication Instructions    atorvastatin (LIPITOR) 20 mg, oral, Daily, as directed.   Appointment  needed for refills.    cholecalciferol (VITAMIN D3) 50 mcg, oral, Daily    dilTIAZem CD (CARDIZEM CD) 120 mg, oral, Daily    metFORMIN XR (GLUCOPHAGE-XR) 500 mg, oral, Daily with breakfast, Do not crush, chew, or split.    omeprazole (PRILOSEC) 20 mg, oral, Daily    sertraline (ZOLOFT) 200 mg, oral, Daily        ALLERGIES  RX Allergies[1]     Patient Active Problem List    Diagnosis Date Noted    Breast calcifications on mammogram 05/27/2025    Abnormal finding on breast imaging 05/27/2025    Hypertension 04/03/2024    Obesity 04/03/2024    Anxiety 10/06/2023    Hyperlipidemia 10/06/2023    Oral infection 10/06/2023    Paroxysmal atrial fibrillation (Multi) 10/06/2023    Vitamin D deficiency 10/06/2023    Vertigo 07/15/2021    Gastroesophageal reflux disease 07/06/2016     Medical History[2]   Surgical History[3]   Family History[4]           Social History     Tobacco Use    Smoking status: Never    Smokeless tobacco: Never   Substance Use Topics    Alcohol use: Not Currently     Comment: Special occasions   Lives in [place]. Accompanied today by her daughter & sister.  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    VITALS  Vitals:    05/28/25 1156   BP: 139/89   Pulse: 83   Temp: 36.8 °C (98.2 °F)        PHYSICAL EXAM  General: Otherwise healthy appearing. No acute distress.     HEENT: Conjunctiva well-colored, sclera non-icteric. Neck supple, trachea midline. No lymphadenopathy or thyromegaly.     Cardiovascular: Regular rate and rhythm.    Respiratory: Clear to auscultation bilaterally.     Breast: Exam performed in the sitting and supine positions. Right breast: No obvious abnormalities palpable. No skin or nipple changes. Left breast: No obvious abnormalities palpable. No skin or nipple changes.     Abdomen: Soft, non-tender, non-distended.    Extremities: Atraumatic, no edema.     Neurologic: Motor and sensory grossly intact. Alert and oriented.     Lymphatic: No axillary, supraclavicular, or  infraclavicular lymphadenopathy bilaterally.       IMAGING    Time was spent viewing digital images of the radiology testing with the patient.       ASSESSMENT/PLAN  Right breast calcifications, stereotactic  core needle biopsy      We discussed your symptoms, relevant history, physical exam, and imaging results. I recommended a core needle biopsy. A breast radiologist will perform the biopsy. We discussed the risks, benefits, and alternatives to biopsy. Our discussion included potential diagnosis of benign, atypical, and malignant (cancer) results.     Instructions:   I usually receive biopsy results in 7-10 business days and will relay these by phone. Results are available to patients directly via My Chart, the  electronic health efrem. If you see your results and have not yet heard from me please call my office at [phone] (option #2). We will then discuss recommendations for follow up and plan care accordingly.     If you have any questions or concerns after the biopsy, or if it has been more than one week and you have not received your biopsy results, please call my office.     I appreciate the opportunity to care for you, and look forward to speaking with you soon.     Jazmine Hatch MD   Breast Surgical Oncology       *DISCLAIMER* Speech recognition software was used to create portions of this document. While an attempt at proofreading has been made, minor errors in transcription may be present.          1. Breast calcifications on mammogram        2. Abnormal finding on breast imaging        3. Hyperlipidemia, unspecified hyperlipidemia type        4. Paroxysmal atrial fibrillation (Multi)        5. Secondary hypertension        6. Vitamin D deficiency        7. Gastroesophageal reflux disease, unspecified whether esophagitis present        8. Abnormal mammogram  Referral to Breast Surgery                          [1]   Allergies  Allergen Reactions    Codeine Hallucinations   [2]   Past Medical  History:  Diagnosis Date    Anxiety     Atrial flutter (Multi) 01/12/2019    Chronic low back pain 07/06/2016    Contact dermatitis 04/03/2024    Cutaneous abscess of limb, unspecified 01/05/2022    Abscess, axilla    Dizziness 07/15/2021    Dysuria 04/03/2024    GERD (gastroesophageal reflux disease)     History of elevated lipids 04/03/2024    Hypertension 04/03/2024    Leukocytosis 04/03/2024    Other abnormal glucose 09/15/2022    Elevated glucose    Pain 12/26/2018    Pain in left wrist 12/26/2018    Arthralgia of left wrist    Pain in left wrist 12/26/2018    Pain of left shoulder region 04/03/2024    Personal history of diseases of the blood and blood-forming organs and certain disorders involving the immune mechanism 01/20/2021    History of leukocytosis    Personal history of diseases of the skin and subcutaneous tissue 01/12/2022    History of cellulitis    Personal history of diseases of the skin and subcutaneous tissue 04/28/2020    History of contact dermatitis    Personal history of other diseases of the musculoskeletal system and connective tissue 09/05/2018    History of back pain    Personal history of other endocrine, nutritional and metabolic disease 09/25/2018    History of hyperlipidemia    Personal history of other specified conditions 08/08/2018    History of urinary frequency    Personal history of other specified conditions 08/21/2021    History of vertigo    Personal history of other specified conditions 08/20/2018    History of flank pain    Strain of muscle and tendon of front wall of thorax, initial encounter 08/21/2021    Chest wall muscle strain    Urinary tract infection, site not specified     Acute UTI   [3]   Past Surgical History:  Procedure Laterality Date    MOUTH SURGERY  09/18/2023    OTHER SURGICAL HISTORY  09/11/2019    Tubal ligation bilateral   [4]   Family History  Problem Relation Name Age of Onset    Multiple myeloma Mother      Stroke Father      Heart attack Father       Other (enlarged heart) Father      Breast cancer Sister 6     Heart disease Sister 6     Diabetes type II Sister 6     No Known Problems Brother 5     Endometriosis Daughter 1     No Known Problems Son 1     Diabetes Maternal Grandmother      Cancer Maternal Grandfather

## 2025-05-28 ENCOUNTER — HOSPITAL ENCOUNTER (OUTPATIENT)
Dept: RADIOLOGY | Facility: CLINIC | Age: 55
Discharge: HOME | End: 2025-05-28
Payer: COMMERCIAL

## 2025-05-28 ENCOUNTER — PROCEDURE VISIT (OUTPATIENT)
Dept: SURGICAL ONCOLOGY | Facility: CLINIC | Age: 55
End: 2025-05-28
Payer: COMMERCIAL

## 2025-05-28 VITALS
HEART RATE: 83 BPM | TEMPERATURE: 98.2 F | BODY MASS INDEX: 33.83 KG/M2 | SYSTOLIC BLOOD PRESSURE: 139 MMHG | DIASTOLIC BLOOD PRESSURE: 89 MMHG | WEIGHT: 216 LBS

## 2025-05-28 DIAGNOSIS — R92.8 ABNORMAL FINDING ON BREAST IMAGING: ICD-10-CM

## 2025-05-28 DIAGNOSIS — I15.9 SECONDARY HYPERTENSION: ICD-10-CM

## 2025-05-28 DIAGNOSIS — R92.8 OTHER ABNORMAL AND INCONCLUSIVE FINDINGS ON DIAGNOSTIC IMAGING OF BREAST: ICD-10-CM

## 2025-05-28 DIAGNOSIS — R92.8 ABNORMAL MAMMOGRAM: ICD-10-CM

## 2025-05-28 DIAGNOSIS — I48.0 PAROXYSMAL ATRIAL FIBRILLATION (MULTI): ICD-10-CM

## 2025-05-28 DIAGNOSIS — K21.9 GASTROESOPHAGEAL REFLUX DISEASE, UNSPECIFIED WHETHER ESOPHAGITIS PRESENT: ICD-10-CM

## 2025-05-28 DIAGNOSIS — E78.5 HYPERLIPIDEMIA, UNSPECIFIED HYPERLIPIDEMIA TYPE: ICD-10-CM

## 2025-05-28 DIAGNOSIS — R92.1 BREAST CALCIFICATION, RIGHT: ICD-10-CM

## 2025-05-28 DIAGNOSIS — R92.1 BREAST CALCIFICATIONS ON MAMMOGRAM: Primary | ICD-10-CM

## 2025-05-28 DIAGNOSIS — E55.9 VITAMIN D DEFICIENCY: ICD-10-CM

## 2025-05-28 PROCEDURE — 99204 OFFICE O/P NEW MOD 45 MIN: CPT | Performed by: SURGERY

## 2025-05-28 PROCEDURE — 19081 BX BREAST 1ST LESION STRTCTC: CPT | Mod: RIGHT SIDE | Performed by: RADIOLOGY

## 2025-05-28 PROCEDURE — 77065 DX MAMMO INCL CAD UNI: CPT | Mod: RIGHT SIDE | Performed by: RADIOLOGY

## 2025-05-28 PROCEDURE — C1739 HC OR 278 NO HCPCS: HCPCS

## 2025-05-28 PROCEDURE — 19081 BX BREAST 1ST LESION STRTCTC: CPT | Mod: RT

## 2025-05-28 PROCEDURE — 2720000007 HC OR 272 NO HCPCS

## 2025-05-28 PROCEDURE — 2500000004 HC RX 250 GENERAL PHARMACY W/ HCPCS (ALT 636 FOR OP/ED): Mod: JZ | Performed by: RADIOLOGY

## 2025-05-28 PROCEDURE — 99214 OFFICE O/P EST MOD 30 MIN: CPT | Performed by: SURGERY

## 2025-05-28 PROCEDURE — 2780000003 HC OR 278 NO HCPCS

## 2025-05-28 RX ADMIN — Medication 10 ML: at 13:14

## 2025-05-28 ASSESSMENT — PATIENT HEALTH QUESTIONNAIRE - PHQ9
SUM OF ALL RESPONSES TO PHQ9 QUESTIONS 1 & 2: 0
1. LITTLE INTEREST OR PLEASURE IN DOING THINGS: NOT AT ALL
2. FEELING DOWN, DEPRESSED OR HOPELESS: NOT AT ALL

## 2025-05-28 ASSESSMENT — PAIN SCALES - GENERAL
PAINLEVEL_OUTOF10: 2
PAINLEVEL_OUTOF10: 0-NO PAIN
PAINLEVEL_OUTOF10: 0 - NO PAIN

## 2025-05-28 NOTE — Clinical Note
Pressure held x 10 minutes, incision dry, steri strips intact and compression dressing applied. Ice pack applied. Clip films completed and okayed

## 2025-05-29 ENCOUNTER — TELEPHONE (OUTPATIENT)
Dept: RADIOLOGY | Facility: CLINIC | Age: 55
End: 2025-05-29
Payer: COMMERCIAL

## 2025-06-04 ENCOUNTER — TELEPHONE (OUTPATIENT)
Dept: SURGERY | Facility: HOSPITAL | Age: 55
End: 2025-06-04
Payer: COMMERCIAL

## 2025-06-04 LAB
LAB AP ASR DISCLAIMER: NORMAL
LABORATORY COMMENT REPORT: NORMAL
PATH REPORT.FINAL DX SPEC: NORMAL
PATH REPORT.GROSS SPEC: NORMAL
PATH REPORT.RELEVANT HX SPEC: NORMAL
PATH REPORT.TOTAL CANCER: NORMAL
PATHOLOGY SYNOPTIC REPORT: NORMAL

## 2025-06-04 NOTE — TELEPHONE ENCOUNTER
I spoke with Jud by phone to relay results of biopsy which showed microinvasive breast cancer. I briefly explained the diagnosis, prognosis, and treatment. She will be contacted by our breast navigator to schedule the needed appointments. All questions answered.        Jazmine Hatch MD   Breast Surgical Oncology

## 2025-06-04 NOTE — PROGRESS NOTES
BREAST SURGICAL ONCOLOGY FOLLOW UP     Subjective   Jud Colunga is a 54 y.o. female presents today for follow up with newly diagnosed carcinoma of the right breast.   She is referred by America Quiles PA-C    She had a stereotactic core biopsy of a 2.2 cm area of calcifications in the inferior lateral right breast on 2025.   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  This was discussed with the radiologist today.    She is here today with her  and daughter to discuss these results and develop a treatment plan.    PAST MEDICAL HISTORY: Hypertension, prediabetes    BREAST IMAGIN25 Right diagnostic mammogram   Spot magnification views of the right breast demonstrate linear  oriented fine pleomorphic calcifications measuring up to 2.2 cm seen  in the inferior lateral right breast at anterior depth. Additionally,  fine linear calcifications are visualized focally anteromedially and  posterolaterally from the aforementioned calcifications. No  suspicious masses are identified.    REPRODUCTIVE HISTORY: menarche age 12, , first birth age 14, no breastfeeding, no OCPs, premenopausal (LMP 23), no HRT or fertility meds;      FAMILY CANCER HISTORY:   Mother with multiple myeloma at age 67,  of disease  Sister with breast cancer in her 30s, alive in her 60s       PHYSICAL EXAM:    General: Alert and oriented x 3.  Mood and affect are appropriate.  Breast: Lymph node exam shows no cervical, supraclavicular, or axillary lymphadenopathy.  Breast exam shows symmetric breasts bilaterally with no skin changes, no dominant masses and no nipple discharge in either breast.  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 DCIS is also called non-invasive breast cancer.  Your pathology report states that there is microinvasive carcinoma which means that the cancer is starting to become an invasive cancer.     There are 2 additional separate areas of calcifications anterior and posterior to the biopsied cluster of calcifications which need to be evaluated.  We will schedule a stereotactic core biopsy of 2 areas of calcifications in the right breast prior to surgery.  If these areas are benign on biopsy, they will not need to be removed at surgery.  If these areas show atypical cells or cancer, they will be included in the area to be removed with a lumpectomy.    We discussed surgical options for breast cancer, which would include a lumpectomy (remove just the cancer in the breast with a small amount of normal tissue around it called a margin), followed by radiation therapy (xray treatments to the breast for 4-6 weeks) or mastectomy (remove the entire breast) with or without reconstruction by a plastic surgeon. The survival rate after a lumpectomy with radiation or a mastectomy are the same.  The chance of a recurrence (the cancer coming back) is slightly higher with a lumpectomy (4-6%) than with a mastectomy (2%).    We have discussed the typical risks of radiation treatment. More common risks are 'sunburn' like changes in the breast, fatigue (feeling tired) and change in size of the breast. Much less common risks are skin cancers, rib fracture, damage to the heart (if the cancer is on the left side) and long-term skin changes. If you have radiation therapy, you will meet with a radiation oncologist  who will discuss this more. Women over 70 years old who have small, non-aggressive (estrogen and progesterone positive, Her2 negative) breast cancers may be able to omit radiation treatments.  Mastectomy can be done with or without reconstruction.  After a mastectomy, there will be loss of sensation (numbness) across the chest.  If you do not have reconstruction, you will have a straight incision on the chest wall which would be as flat as possible.  You can be fit for a breast prosthesis in the future that you could wear in a bra or bathing suit.  We have discussed options for reconstruction briefly. If you choose to have reconstruction, you will meet with a plastic surgeon who will discuss this more. We discussed how smoking impacts options for breast reconstruction.   If you are having a lumpectomy and the cancer is not able to be felt in your breast, a small magnetic marking clip called a Magseed will be placed in the breast to help locate the breast cancer tissue that will be removed at surgery. This is a minor procedure which will be done by a radiologist sometime before surgery. It is similar to having the marker placed when you had the biopsy.  We have discussed that the need for chemotherapy may not be determined until after you have surgery. Having a lumpectomy or a mastectomy will not change the decision of whether or nor chemotherapy is needed.   We also discussed proceeding with a sentinel lymph node biopsy. That means to remove a few lymph nodes under the arm to test for cancer.   The risks, benefits, and procedures of all of these were discussed, including bleeding, infection, need for additional surgery to get clear margins, scar tissue formation, deformity of the area, decreased function, mobility or strength of the arm, arm swelling, increased risk of infection in the arm, numbness, pain or burning under the arm and the risk of general anesthesia. We discussed typical recovery after surgery and the  typical time until you can resume all activities. She understood this and all of her questions were answered.     I have discussed with the patient the pathophysiology of the disease process and the rationale for the planned surgery. I have explained the anticipated risks and possible complications, the incidences and consequences of those risks and complications, and the expected postoperative course. Alternatives have been discussed including the alternative of no surgery. The patient has been given the opportunity to ask questions and all her questions have been answered to her satisfaction.  Individual shared decision making was implemented.    Surgery has been recommended. The risks, benefits and procedure have been reviewed with you today.   You may be scheduled for pre-surgical testing and detailed instructions will be given to you at that appointment.  The pathology results from your surgery should be available about 14 days after the procedure. I will call you with the results. If you do not hear from me within 21 days, please call the office at [phone] for your results.    Schedule right breast stereotactic core biopsy x 2  Following the core biopsy results, we will schedule a right Magseed localized (possibly bracketed) lumpectomy and right axillary sentinel lymph node biopsy    Wendy Maxwell MD

## 2025-06-13 ENCOUNTER — APPOINTMENT (OUTPATIENT)
Dept: RADIOLOGY | Facility: CLINIC | Age: 55
End: 2025-06-13
Payer: COMMERCIAL

## 2025-06-13 ENCOUNTER — OFFICE VISIT (OUTPATIENT)
Dept: SURGICAL ONCOLOGY | Facility: CLINIC | Age: 55
End: 2025-06-13
Payer: COMMERCIAL

## 2025-06-13 VITALS
OXYGEN SATURATION: 97 % | TEMPERATURE: 97.6 F | DIASTOLIC BLOOD PRESSURE: 92 MMHG | HEART RATE: 86 BPM | BODY MASS INDEX: 33.67 KG/M2 | WEIGHT: 215 LBS | SYSTOLIC BLOOD PRESSURE: 136 MMHG

## 2025-06-13 DIAGNOSIS — R92.8 ABNORMAL FINDING ON BREAST IMAGING: Primary | ICD-10-CM

## 2025-06-13 DIAGNOSIS — C50.911 DUCTAL CARCINOMA IN SITU OF BREAST WITH MICROINVASIVE COMPONENT, RIGHT: ICD-10-CM

## 2025-06-13 PROCEDURE — 99215 OFFICE O/P EST HI 40 MIN: CPT | Performed by: SURGERY

## 2025-06-13 PROCEDURE — 3080F DIAST BP >= 90 MM HG: CPT | Performed by: SURGERY

## 2025-06-13 PROCEDURE — 3075F SYST BP GE 130 - 139MM HG: CPT | Performed by: SURGERY

## 2025-06-13 PROCEDURE — 1036F TOBACCO NON-USER: CPT | Performed by: SURGERY

## 2025-06-13 ASSESSMENT — PATIENT HEALTH QUESTIONNAIRE - PHQ9
SUM OF ALL RESPONSES TO PHQ9 QUESTIONS 1 & 2: 0
2. FEELING DOWN, DEPRESSED OR HOPELESS: NOT AT ALL
1. LITTLE INTEREST OR PLEASURE IN DOING THINGS: NOT AT ALL

## 2025-06-13 ASSESSMENT — PAIN SCALES - GENERAL: PAINLEVEL_OUTOF10: 0-NO PAIN

## 2025-06-13 NOTE — PATIENT INSTRUCTIONS
Patient scheduled for a two spot stereotatic biopsy on 6/20/25  Surgical date to be determined after results are in.  Educational material given to patient and family, all discussed

## 2025-06-20 ENCOUNTER — HOSPITAL ENCOUNTER (OUTPATIENT)
Dept: RADIOLOGY | Facility: CLINIC | Age: 55
Discharge: HOME | End: 2025-06-20
Payer: COMMERCIAL

## 2025-06-20 DIAGNOSIS — R92.8 OTHER ABNORMAL AND INCONCLUSIVE FINDINGS ON DIAGNOSTIC IMAGING OF BREAST: ICD-10-CM

## 2025-06-20 DIAGNOSIS — R92.0 MAMMOGRAPHIC MICROCALCIFICATION FOUND ON DIAGNOSTIC IMAGING OF BREAST: ICD-10-CM

## 2025-06-20 PROCEDURE — 2500000004 HC RX 250 GENERAL PHARMACY W/ HCPCS (ALT 636 FOR OP/ED): Performed by: RADIOLOGY

## 2025-06-20 PROCEDURE — 19081 BX BREAST 1ST LESION STRTCTC: CPT | Mod: RT

## 2025-06-20 PROCEDURE — 2720000007 HC OR 272 NO HCPCS

## 2025-06-20 PROCEDURE — 2780000003 HC OR 278 NO HCPCS

## 2025-06-20 PROCEDURE — C1739 HC OR 272 NO HCPCS: HCPCS

## 2025-06-20 PROCEDURE — C1739 HC OR 278 NO HCPCS: HCPCS

## 2025-06-20 RX ADMIN — Medication 20 ML: at 08:39

## 2025-06-20 ASSESSMENT — PAIN SCALES - GENERAL
PAINLEVEL_OUTOF10: 0 - NO PAIN
PAINLEVEL_OUTOF10: 3
PAINLEVEL_OUTOF10: 0 - NO PAIN

## 2025-06-20 NOTE — Clinical Note
Second time out. Starting second site biopsy. Pupils equal, round and reactive to light, Extra-ocular movement intact, eyes are clear b/l

## 2025-06-23 ENCOUNTER — TELEPHONE (OUTPATIENT)
Dept: RADIOLOGY | Facility: CLINIC | Age: 55
End: 2025-06-23

## 2025-06-23 NOTE — TELEPHONE ENCOUNTER
Called patient to follow up from biopsy procedures done on 6/20.   She is having soreness at biopsy sites. She used ice packs and tylenol as directed.  Tylenol was not helping much so she switched to Aleve yesterday and is getting some relief from that.  Outer bandage was removed and steri strips are in place.  Has some redness and bruising but denies bleeding, yellow drainage, and swelling.  She feels that the soreness is improving today.  She is at work and denies doing anything strenuous.  Advised her to continue with ice packs and Aleve if that is helping.  If redness or pain worsen, advised her to call Dr. Maxwell's office.  Patient verbalizes understanding. Kinza Sanders RN

## 2025-06-25 ENCOUNTER — TELEPHONE (OUTPATIENT)
Dept: SURGICAL ONCOLOGY | Facility: CLINIC | Age: 55
End: 2025-06-25
Payer: COMMERCIAL

## 2025-06-25 LAB
LAB AP ASR DISCLAIMER: NORMAL
LAB AP BLOCK FOR ADDITIONAL STUDIES: NORMAL
LABORATORY COMMENT REPORT: NORMAL
PATH REPORT.FINAL DX SPEC: NORMAL
PATH REPORT.GROSS SPEC: NORMAL
PATH REPORT.RELEVANT HX SPEC: NORMAL
PATH REPORT.TOTAL CANCER: NORMAL
PATHOLOGY SYNOPTIC REPORT: NORMAL

## 2025-06-25 NOTE — TELEPHONE ENCOUNTER
Patient called in and is having paind and oozing at the biopsy site.  She is in housekeeping and using the arm.  I explained that it is all normal, to use ice and tylenol for discomfort.  It is is possible she should limit use of that arm and give it time to heal.  She understood  and will try those recommendations.Call ended

## 2025-06-26 ENCOUNTER — TELEPHONE (OUTPATIENT)
Dept: SURGICAL ONCOLOGY | Facility: CLINIC | Age: 55
End: 2025-06-26
Payer: COMMERCIAL

## 2025-06-26 NOTE — TELEPHONE ENCOUNTER
Result Communication    Resulted Orders   Surgical Pathology Exam   Result Value Ref Range    Case Report       Surgical Pathology                                Case: [id number]                                  Authorizing Provider:  Wendy aMxwell MD            Collected:           06/20/2025 0853              Ordering Location:     [facility]       Received:            06/20/2025 1102                                     Center                                                                       Pathologist:           Cherie Riojas MD                                                           Specimens:   A) - BREAST CORE BIOPSY RIGHT, ANTERIOR RIGHT BREAST CALCIFICATIONS                                 B) - BREAST CORE BIOPSY RIGHT, POSTERIOR RIGHT BREAST CALCIFICATIONS                       FINAL DIAGNOSIS       A. Right breast anterior calcifications, stereotactic guided core biopsy:  -- Usual ductal hyperplasia, see note.  -- Microscopic papilloma.  -- Columnar cell change.  -- Dilated ducts.  -- Microcalcifications are present in benign ducts.    Note: Immunostain for CK5/6 shows mosaic type staining while ER shows heterogenous staining on A1, supporting the diagnosis. Multiple deeper levels were examined were reviewed on A2.    B.  Right breast posterior calcifications, stereotactic guided core biopsy:  -- Ductal carcinoma in situ, high nuclear grade, solid pattern with comedo type necrosis and calcifications, see note.  -- Microcalcifications also present in unremarkable lobule.    Note:  ER is pending and will be reported in an addendum.    This case was shown at the breast consensus conference via zoom meeting.                  By the signature on this report, the individual or group listed as making the Final Interpretation/Diagnosis certifies that they have reviewed this case.       Case Summary Report       Breast Biomarker Reporting Template   BREAST BIOMARKER REPORTING TEMPLATE - B    "Protocol posted: 12/13/2023         Test(s) Performed:            Estrogen Receptor (ER) Status:    Negative (less than 1%)           :    Internal control cells present and stain as expected         Test Type:    Food and Drug Administration (FDA) cleared (test / vendor): Roche CONFIRM anti-(ER) (SP1) Rabbit Monoclonal Primary Antibody, Roche Multimer Detection         Primary Antibody:    SP1         Scoring System:    No separate scoring system used       Cold Ischemia and Fixation Times:    Meet requirements specified in latest version of the ASCO / CAP Guidelines       Testing Performed on Block Number(s):    B2       METHODS      Fixative:    Formalin       Image Analysis:    Not performed          Comment(s):    Analysis performed on DCIS.       Block for Additional Biomarkers/Molecular Studies       Tumor Block: B2      Clinical History       STEREOTACTIC CORE BIOPSY OF RIGHT BREAST CALCIFICATIONS POSTERIOR (B) WITH VACUUM ASSISTANCE FORMALIN @ 855AM      Gross Description       A: Received in formalin, labeled with the patient´s name and hospital number and \"right breast calcification\", are multiple irregular segments of yellow-white fatty soft tissue aggregating to 2.5 x 1.5 x 0.5 cm.  The specimen is submitted in toto in 2 cassettes.  BLANCA    NOTE:  Ischemia time: 6/20/2020 09:26  This specimen was placed into formalin at: 6/20/2025  09:28  B: Received in formalin, labeled with the patient´s name and hospital number and \"right breast calcifications, posterior\", are multiple irregular segments of yellow-white fatty soft tissue aggregating to 2.5 x 1.5 x 0.7 cm.  The specimen is submitted in toto in 2 cassettes.  KE    NOTE:  Ischemia time: 6/20/2025  08:53  This specimen was placed into formalin at: 6/20/2025  08:55      Disclaimer       One or more of the reagents used to perform assays on this specimen MAY have contained components considered to be analyte specific reagents (ASR's).  ASR's have not been " cleared or approved by the U.S. Food and Drug Administration.  These assays were developed and their performance characteristics determined by the Department of Pathology at [facility]. The FDA does not require this test to go through premarket FDA review. This test is used for clinical purposes. It should not be regarded as investigational or for research. This laboratory is certified under the Clinical Laboratory Improvement Amendments (CLIA) as qualified to perform high complexity clinical laboratory testing.  The assays were performed with appropriate positive and negative controls which stained appropriately.         1:32 PM      The patient was called with the pathology results.  A message was left on voicemail.  We will discuss the results at her postoperative visit.  She is to call if she has questions prior to that.  One area was benign. One showed DCIS. Will proceed with bracketed right lumpectomy and SLNB

## 2025-06-26 NOTE — TELEPHONE ENCOUNTER
Result Communication    Resulted Orders   Surgical Pathology Exam   Result Value Ref Range    Case Report       Surgical Pathology                                Case: [id number]                                  Authorizing Provider:  Wendy Maxwell MD            Collected:           06/20/2025 0853              Ordering Location:     [facility]       Received:            06/20/2025 1102                                     Center                                                                       Pathologist:           Cherie Riojas MD                                                           Specimens:   A) - BREAST CORE BIOPSY RIGHT, ANTERIOR RIGHT BREAST CALCIFICATIONS                                 B) - BREAST CORE BIOPSY RIGHT, POSTERIOR RIGHT BREAST CALCIFICATIONS                       FINAL DIAGNOSIS       A. Right breast anterior calcifications, stereotactic guided core biopsy:  -- Usual ductal hyperplasia, see note.  -- Microscopic papilloma.  -- Columnar cell change.  -- Dilated ducts.  -- Microcalcifications are present in benign ducts.    Note: Immunostain for CK5/6 shows mosaic type staining while ER shows heterogenous staining on A1, supporting the diagnosis. Multiple deeper levels were examined were reviewed on A2.    B.  Right breast posterior calcifications, stereotactic guided core biopsy:  -- Ductal carcinoma in situ, high nuclear grade, solid pattern with comedo type necrosis and calcifications, see note.  -- Microcalcifications also present in unremarkable lobule.    Note:  ER is pending and will be reported in an addendum.    This case was shown at the breast consensus conference via zoom meeting.                  By the signature on this report, the individual or group listed as making the Final Interpretation/Diagnosis certifies that they have reviewed this case.       Case Summary Report       Breast Biomarker Reporting Template   BREAST BIOMARKER REPORTING TEMPLATE - B    "Protocol posted: 12/13/2023         Test(s) Performed:            Estrogen Receptor (ER) Status:    Negative (less than 1%)           :    Internal control cells present and stain as expected         Test Type:    Food and Drug Administration (FDA) cleared (test / vendor): Roche CONFIRM anti-(ER) (SP1) Rabbit Monoclonal Primary Antibody, Roche Multimer Detection         Primary Antibody:    SP1         Scoring System:    No separate scoring system used       Cold Ischemia and Fixation Times:    Meet requirements specified in latest version of the ASCO / CAP Guidelines       Testing Performed on Block Number(s):    B2       METHODS      Fixative:    Formalin       Image Analysis:    Not performed          Comment(s):    Analysis performed on DCIS.       Block for Additional Biomarkers/Molecular Studies       Tumor Block: B2      Clinical History       STEREOTACTIC CORE BIOPSY OF RIGHT BREAST CALCIFICATIONS POSTERIOR (B) WITH VACUUM ASSISTANCE FORMALIN @ 855AM      Gross Description       A: Received in formalin, labeled with the patient´s name and hospital number and \"right breast calcification\", are multiple irregular segments of yellow-white fatty soft tissue aggregating to 2.5 x 1.5 x 0.5 cm.  The specimen is submitted in toto in 2 cassettes.  BLANCA    NOTE:  Ischemia time: 6/20/2020 09:26  This specimen was placed into formalin at: 6/20/2025  09:28  B: Received in formalin, labeled with the patient´s name and hospital number and \"right breast calcifications, posterior\", are multiple irregular segments of yellow-white fatty soft tissue aggregating to 2.5 x 1.5 x 0.7 cm.  The specimen is submitted in toto in 2 cassettes.  KE    NOTE:  Ischemia time: 6/20/2025  08:53  This specimen was placed into formalin at: 6/20/2025  08:55      Disclaimer       One or more of the reagents used to perform assays on this specimen MAY have contained components considered to be analyte specific reagents (ASR's).  ASR's have not been " cleared or approved by the U.S. Food and Drug Administration.  These assays were developed and their performance characteristics determined by the Department of Pathology at [facility]. The FDA does not require this test to go through premarket FDA review. This test is used for clinical purposes. It should not be regarded as investigational or for research. This laboratory is certified under the Clinical Laboratory Improvement Amendments (CLIA) as qualified to perform high complexity clinical laboratory testing.  The assays were performed with appropriate positive and negative controls which stained appropriately.         3:58 PM      Results were successfully communicated with the patient and they acknowledged their understanding.

## 2025-06-27 ENCOUNTER — PREP FOR PROCEDURE (OUTPATIENT)
Dept: SURGICAL ONCOLOGY | Facility: CLINIC | Age: 55
End: 2025-06-27
Payer: COMMERCIAL

## 2025-06-30 DIAGNOSIS — D05.90 CARCINOMA IN SITU OF BREAST, UNSPECIFIED LATERALITY, UNSPECIFIED TYPE: ICD-10-CM

## 2025-06-30 PROBLEM — C50.911: Status: ACTIVE | Noted: 2025-06-13

## 2025-07-01 LAB
BASOPHILS # BLD AUTO: 30 CELLS/UL (ref 0–200)
BASOPHILS NFR BLD AUTO: 0.8 %
EOSINOPHIL # BLD AUTO: 70 CELLS/UL (ref 15–500)
EOSINOPHIL NFR BLD AUTO: 1.9 %
ERYTHROCYTE [DISTWIDTH] IN BLOOD BY AUTOMATED COUNT: 15 % (ref 11–15)
HCT VFR BLD AUTO: 39.9 % (ref 35–45)
HGB BLD-MCNC: 12.7 G/DL (ref 11.7–15.5)
LYMPHOCYTES # BLD AUTO: 1524 CELLS/UL (ref 850–3900)
LYMPHOCYTES NFR BLD AUTO: 41.2 %
MCH RBC QN AUTO: 30 PG (ref 27–33)
MCHC RBC AUTO-ENTMCNC: 31.8 G/DL (ref 32–36)
MCV RBC AUTO: 94.3 FL (ref 80–100)
MONOCYTES # BLD AUTO: 400 CELLS/UL (ref 200–950)
MONOCYTES NFR BLD AUTO: 10.8 %
NEUTROPHILS # BLD AUTO: 1676 CELLS/UL (ref 1500–7800)
NEUTROPHILS NFR BLD AUTO: 45.3 %
PLATELET # BLD AUTO: 208 THOUSAND/UL (ref 140–400)
PMV BLD REES-ECKER: 11.3 FL (ref 7.5–12.5)
RBC # BLD AUTO: 4.23 MILLION/UL (ref 3.8–5.1)
WBC # BLD AUTO: 3.7 THOUSAND/UL (ref 3.8–10.8)

## 2025-07-02 ENCOUNTER — APPOINTMENT (OUTPATIENT)
Dept: PRIMARY CARE | Facility: CLINIC | Age: 55
End: 2025-07-02
Payer: COMMERCIAL

## 2025-07-02 VITALS
TEMPERATURE: 98.4 F | BODY MASS INDEX: 34.59 KG/M2 | OXYGEN SATURATION: 98 % | WEIGHT: 220.4 LBS | SYSTOLIC BLOOD PRESSURE: 128 MMHG | DIASTOLIC BLOOD PRESSURE: 68 MMHG | HEIGHT: 67 IN | HEART RATE: 86 BPM

## 2025-07-02 DIAGNOSIS — F41.9 ANXIETY: ICD-10-CM

## 2025-07-02 DIAGNOSIS — R73.9 HYPERGLYCEMIA: ICD-10-CM

## 2025-07-02 DIAGNOSIS — K21.9 GASTROESOPHAGEAL REFLUX DISEASE, UNSPECIFIED WHETHER ESOPHAGITIS PRESENT: ICD-10-CM

## 2025-07-02 DIAGNOSIS — E78.5 HYPERLIPIDEMIA, UNSPECIFIED HYPERLIPIDEMIA TYPE: Primary | ICD-10-CM

## 2025-07-02 DIAGNOSIS — E55.9 VITAMIN D DEFICIENCY: ICD-10-CM

## 2025-07-02 DIAGNOSIS — I48.0 PAROXYSMAL ATRIAL FIBRILLATION (MULTI): ICD-10-CM

## 2025-07-02 DIAGNOSIS — Z00.00 WELL ADULT EXAM: ICD-10-CM

## 2025-07-02 PROCEDURE — 3008F BODY MASS INDEX DOCD: CPT | Performed by: PHYSICIAN ASSISTANT

## 2025-07-02 PROCEDURE — 1036F TOBACCO NON-USER: CPT | Performed by: PHYSICIAN ASSISTANT

## 2025-07-02 PROCEDURE — 3078F DIAST BP <80 MM HG: CPT | Performed by: PHYSICIAN ASSISTANT

## 2025-07-02 PROCEDURE — 3074F SYST BP LT 130 MM HG: CPT | Performed by: PHYSICIAN ASSISTANT

## 2025-07-02 PROCEDURE — 99213 OFFICE O/P EST LOW 20 MIN: CPT | Performed by: PHYSICIAN ASSISTANT

## 2025-07-02 RX ORDER — ATORVASTATIN CALCIUM 20 MG/1
20 TABLET, FILM COATED ORAL DAILY
Qty: 90 TABLET | Refills: 1 | Status: SHIPPED | OUTPATIENT
Start: 2025-07-02 | End: 2025-12-29

## 2025-07-02 RX ORDER — DILTIAZEM HYDROCHLORIDE 120 MG/1
120 CAPSULE, COATED, EXTENDED RELEASE ORAL DAILY
Qty: 90 CAPSULE | Refills: 1 | Status: SHIPPED | OUTPATIENT
Start: 2025-07-02 | End: 2025-12-29

## 2025-07-02 RX ORDER — OMEPRAZOLE 20 MG/1
20 CAPSULE, DELAYED RELEASE ORAL DAILY
Qty: 90 CAPSULE | Refills: 1 | Status: SHIPPED | OUTPATIENT
Start: 2025-07-02 | End: 2025-12-29

## 2025-07-02 RX ORDER — ACETAMINOPHEN 500 MG
50 TABLET ORAL DAILY
Qty: 90 CAPSULE | Refills: 1 | Status: SHIPPED | OUTPATIENT
Start: 2025-07-02 | End: 2025-12-29

## 2025-07-02 RX ORDER — METFORMIN HYDROCHLORIDE 500 MG/1
500 TABLET, EXTENDED RELEASE ORAL
Qty: 90 TABLET | Refills: 1 | Status: SHIPPED | OUTPATIENT
Start: 2025-07-02 | End: 2025-12-29

## 2025-07-02 RX ORDER — SERTRALINE HYDROCHLORIDE 100 MG/1
200 TABLET, FILM COATED ORAL DAILY
Qty: 180 TABLET | Refills: 1 | Status: SHIPPED | OUTPATIENT
Start: 2025-07-02 | End: 2025-12-29

## 2025-07-02 ASSESSMENT — PATIENT HEALTH QUESTIONNAIRE - PHQ9
2. FEELING DOWN, DEPRESSED OR HOPELESS: NOT AT ALL
1. LITTLE INTEREST OR PLEASURE IN DOING THINGS: NOT AT ALL
SUM OF ALL RESPONSES TO PHQ9 QUESTIONS 1 AND 2: 0

## 2025-07-03 LAB
25(OH)D3+25(OH)D2 SERPL-MCNC: 37 NG/ML (ref 30–100)
ALBUMIN SERPL-MCNC: 4.4 G/DL (ref 3.6–5.1)
ALP SERPL-CCNC: 97 U/L (ref 37–153)
ALT SERPL-CCNC: 11 U/L (ref 6–29)
ANION GAP SERPL CALCULATED.4IONS-SCNC: 11 MMOL/L (CALC) (ref 7–17)
AST SERPL-CCNC: 27 U/L (ref 10–35)
BILIRUB SERPL-MCNC: 0.3 MG/DL (ref 0.2–1.2)
BUN SERPL-MCNC: 7 MG/DL (ref 7–25)
CALCIUM SERPL-MCNC: 9.3 MG/DL (ref 8.6–10.4)
CHLORIDE SERPL-SCNC: 104 MMOL/L (ref 98–110)
CHOLEST SERPL-MCNC: 200 MG/DL
CHOLEST/HDLC SERPL: 2 (CALC)
CO2 SERPL-SCNC: 23 MMOL/L (ref 20–32)
CREAT SERPL-MCNC: 0.63 MG/DL (ref 0.5–1.03)
EGFRCR SERPLBLD CKD-EPI 2021: 104 ML/MIN/1.73M2
EST. AVERAGE GLUCOSE BLD GHB EST-MCNC: 117 MG/DL
EST. AVERAGE GLUCOSE BLD GHB EST-SCNC: 6.5 MMOL/L
GLUCOSE SERPL-MCNC: 78 MG/DL (ref 65–99)
HBA1C MFR BLD: 5.7 %
HDLC SERPL-MCNC: 99 MG/DL
LDLC SERPL CALC-MCNC: 86 MG/DL (CALC)
NONHDLC SERPL-MCNC: 101 MG/DL (CALC)
POTASSIUM SERPL-SCNC: 4.4 MMOL/L (ref 3.5–5.3)
PROT SERPL-MCNC: 7 G/DL (ref 6.1–8.1)
SODIUM SERPL-SCNC: 138 MMOL/L (ref 135–146)
TRIGL SERPL-MCNC: 66 MG/DL

## 2025-07-08 ENCOUNTER — TELEPHONE (OUTPATIENT)
Dept: CARDIAC SURGERY | Facility: HOSPITAL | Age: 55
End: 2025-07-08
Payer: COMMERCIAL

## 2025-07-10 ENCOUNTER — CLINICAL SUPPORT (OUTPATIENT)
Dept: PREADMISSION TESTING | Facility: HOSPITAL | Age: 55
End: 2025-07-10
Payer: COMMERCIAL

## 2025-07-10 RX ORDER — IBUPROFEN 400 MG/1
400 TABLET, FILM COATED ORAL EVERY 6 HOURS PRN
COMMUNITY

## 2025-07-10 NOTE — CPM/PAT NURSE NOTE
CPM/PAT Nurse Note      Name: Jud Colunga)  /Age: 1970/54 y.o.       Medical History[1]    Surgical History[2]    Patient  reports that she is not currently sexually active.    Family History[3]    Allergies[4]    Prior to Admission medications    Medication Sig Start Date End Date Taking? Authorizing Provider   atorvastatin (Lipitor) 20 mg tablet Take 1 tablet (20 mg) by mouth once daily. as directed.   Appointment needed for refills. 25  America Quiles PA-C   cholecalciferol (Vitamin D3) 50 mcg (2,000 units) capsule Take 1 capsule (50 mcg) by mouth once daily. 25  America Quiles PA-C   dilTIAZem CD (Cardizem CD) 120 mg 24 hr capsule Take 1 capsule (120 mg) by mouth once daily. 25  America Quiles PA-C   ibuprofen 400 mg tablet Take 1 tablet (400 mg) by mouth every 6 hours if needed for moderate pain (4 - 6).    Historical Provider, MD   metFORMIN XR (Glucophage-XR) 500 mg 24 hr tablet Take 1 tablet (500 mg) by mouth once daily with breakfast. Do not crush, chew, or split. 25  America Quiles PA-C   omeprazole (PriLOSEC) 20 mg DR capsule Take 1 capsule (20 mg) by mouth once daily. 25  America Quiles PA-C   sertraline (Zoloft) 100 mg tablet Take 2 tablets (200 mg) by mouth once daily. 25  DREW Wood ROS     DASI Risk Score    No data to display       Caprini DVT Assessment    No data to display       Modified Frailty Index    No data to display       [id number] Stroke Risk Points  Current as of just now        2 0 to 9 Points:      No Change          The [id number] risk score (Ubaldo FELTON, et al. 2009. © 2010 American College [employer]) quantifies the risk of stroke for a patient with atrial fibrillation. For patients without atrial fibrillation or under the age of 18 this score appears as N/A. Higher score values generally indicate higher risk of stroke.          Points  Metrics   0 Has Congestive Heart Failure:  No     Patients with congestive heart failure get 1 point.    Current as of just now   1 Has Hypertension:  Yes     Patients with hypertension get 1 point.    Current as of just now   0 Age:  54     Patients 65 to 74 years old get 1 point, or patients 75 years and older get 2 points.    Current as of just now   0 Has Diabetes:  No     Patients with diabetes get 1 point.    Current as of just now   0 Had Stroke:  No  Had TIA:  No  Had Thromboembolism:  No     Patients who have had a stroke, TIA, or thromboembolism get 2 points.    Current as of just now   0 Has Vascular Disease:  No     Patients with vascular disease get 1 point.    Current as of just now   1 Clinically Relevant Sex:  Female     Patients with a clinically relevant sex of Female get 1 point.    Current as of just now             Revised Cardiac Risk Index    No data to display       Apfel Simplified Score    No data to display       Risk Analysis Index Results This Encounter    No data found in the last 10 encounters.       Prodigy: High Risk  Total Score: 0          ARISCAT Score for Postoperative Pulmonary Complications    No data to display       Angulo Perioperative Risk for Myocardial Infarction or Cardiac Arrest (ALINE)    No data to display         Nurse Plan of Action:     RN screening call complete.  Reviewed allergies, medications and pharmacy, medical, surgical and social history with patient.  Chart updated.  Instructed patient to stop IBU 1 week prior to surgery.             [1]   Past Medical History:  Diagnosis Date    Anxiety     Atrial flutter (Multi) 01/12/2019    Afib 2019-brief episode that spontaneously terminated with no further issues, sees Dr. Montesinos at [facility] as needed, lovn in [place] 8/15/23    Dizziness 07/15/2021    no episodes in years    Dysuria 04/03/2024    GERD (gastroesophageal reflux disease)     History of echocardiogram 2021    History of right breast cancer     HLD  (hyperlipidemia)     HTN (hypertension)     Hypertension 04/03/2024    Pre-diabetes     A1C= 5.75 on 7/2/25   [2]   Past Surgical History:  Procedure Laterality Date    COLONOSCOPY      MOUTH SURGERY  09/18/2023    dental procedure    TUBAL LIGATION  09/11/2019    Tubal ligation bilateral   [3]   Family History  Problem Relation Name Age of Onset    Multiple myeloma Mother      Stroke Father      Heart attack Father      Other (enlarged heart) Father      Breast cancer Sister 6     Heart disease Sister 6     Diabetes type II Sister 6     No Known Problems Brother 5     Endometriosis Daughter 1     No Known Problems Son 1     Diabetes Maternal Grandmother      Cancer Maternal Grandfather     [4]   Allergies  Allergen Reactions    Codeine Hallucinations

## 2025-07-14 ENCOUNTER — HOSPITAL ENCOUNTER (OUTPATIENT)
Dept: RADIOLOGY | Facility: CLINIC | Age: 55
Discharge: HOME | End: 2025-07-14
Payer: COMMERCIAL

## 2025-07-14 ENCOUNTER — PRE-ADMISSION TESTING (OUTPATIENT)
Dept: PREADMISSION TESTING | Facility: HOSPITAL | Age: 55
End: 2025-07-14
Payer: COMMERCIAL

## 2025-07-14 ENCOUNTER — APPOINTMENT (OUTPATIENT)
Dept: RADIOLOGY | Facility: CLINIC | Age: 55
End: 2025-07-14
Payer: COMMERCIAL

## 2025-07-14 VITALS
WEIGHT: 218.26 LBS | OXYGEN SATURATION: 97 % | DIASTOLIC BLOOD PRESSURE: 89 MMHG | TEMPERATURE: 97.7 F | SYSTOLIC BLOOD PRESSURE: 148 MMHG | BODY MASS INDEX: 34.26 KG/M2 | HEIGHT: 67 IN | HEART RATE: 80 BPM

## 2025-07-14 DIAGNOSIS — D05.90 CARCINOMA IN SITU OF BREAST, UNSPECIFIED LATERALITY, UNSPECIFIED TYPE: ICD-10-CM

## 2025-07-14 DIAGNOSIS — I15.9 SECONDARY HYPERTENSION: Primary | ICD-10-CM

## 2025-07-14 DIAGNOSIS — D05.10 DUCTAL CARCINOMA IN SITU (DCIS) OF BREAST: ICD-10-CM

## 2025-07-14 LAB
ATRIAL RATE: 82 BPM
P AXIS: 54 DEGREES
P OFFSET: 182 MS
P ONSET: 126 MS
PR INTERVAL: 186 MS
Q ONSET: 219 MS
QRS COUNT: 14 BEATS
QRS DURATION: 90 MS
QT INTERVAL: 392 MS
QTC CALCULATION(BAZETT): 457 MS
QTC FREDERICIA: 435 MS
R AXIS: 28 DEGREES
T AXIS: 47 DEGREES
T OFFSET: 415 MS
VENTRICULAR RATE: 82 BPM

## 2025-07-14 PROCEDURE — 93005 ELECTROCARDIOGRAM TRACING: CPT | Performed by: NURSE PRACTITIONER

## 2025-07-14 PROCEDURE — C1739 HC OR 278 NO HCPCS: HCPCS

## 2025-07-14 PROCEDURE — 19282 PERQ DEVICE BREAST EA IMAG: CPT | Mod: RT

## 2025-07-14 PROCEDURE — 2780000003 HC OR 278 NO HCPCS

## 2025-07-14 PROCEDURE — 19281 PERQ DEVICE BREAST 1ST IMAG: CPT | Mod: RT

## 2025-07-14 PROCEDURE — 93010 ELECTROCARDIOGRAM REPORT: CPT | Performed by: INTERNAL MEDICINE

## 2025-07-14 PROCEDURE — 2500000004 HC RX 250 GENERAL PHARMACY W/ HCPCS (ALT 636 FOR OP/ED): Performed by: RADIOLOGY

## 2025-07-14 PROCEDURE — 77065 DX MAMMO INCL CAD UNI: CPT | Mod: RIGHT SIDE | Performed by: RADIOLOGY

## 2025-07-14 RX ADMIN — Medication 7 ML: at 10:31

## 2025-07-14 ASSESSMENT — PAIN SCALES - GENERAL
PAINLEVEL_OUTOF10: 0 - NO PAIN

## 2025-07-14 ASSESSMENT — ENCOUNTER SYMPTOMS
GASTROINTESTINAL NEGATIVE: 1
NECK NEGATIVE: 1
NERVOUS/ANXIOUS: 1
EYES NEGATIVE: 1
CARDIOVASCULAR NEGATIVE: 1
CONSTITUTIONAL NEGATIVE: 1
ENDOCRINE NEGATIVE: 1
RESPIRATORY NEGATIVE: 1

## 2025-07-14 ASSESSMENT — PAIN - FUNCTIONAL ASSESSMENT: PAIN_FUNCTIONAL_ASSESSMENT: 0-10

## 2025-07-14 NOTE — DISCHARGE INSTRUCTIONS
Post procedure care reviewed with patient, ok to resume normal activity with no restrictions. Patient verbalized understanding and will follow up surgery as planned.

## 2025-07-14 NOTE — H&P (VIEW-ONLY)
[facility]/PAT Evaluation       Name: Jud Colunga (Jud Colunga)  /Age: 1970/54 y.o.     In-Person         Date of Consult: 25    Referring Provider: Dr. Maxwell      Date, Surgery, and Length: 25, right breast lumpectomy with magseed localization- right axillary sentinel lymph node biopsy, 115 minutes        Patient presents to [facility] for perioperative risk assessment prior to scheduled surgery. Patient presents with:  Clinical stage IA right breast cancer diagnosed on 2025  -[id number]  Microinvasive carcinoma in a background of DCIS, intermediate to high nuclear grade  ER 10%        This note was created in part upon personal review of patient's medical records.        Pt denies any past history of anesthetic complications such as PONV, awareness, prolonged sedation, dental damage, aspiration, cardiac arrest, difficult intubation, difficult I.V. access or unexpected hospital admissions. No history of malignant hyperthermia and or pseudocholinesterase deficiency.    No history of blood transfusions.    The patient IS NOT a Tenriism and will accept blood and blood products if medically indicated.     Type and screen NOT sent.      Past Medical History:   Diagnosis Date    Anxiety     Atrial flutter (Multi) 2019    Afib 2019-brief episode that spontaneously terminated with no further issues, sees Dr. Montesinos at [facility] as needed, raj in [place] 8/15/23    Dizziness 07/15/2021    no episodes in years    Dysuria 2024    GERD (gastroesophageal reflux disease)     History of echocardiogram     History of right breast cancer     HLD (hyperlipidemia)     HTN (hypertension)     Hypertension 2024    Pre-diabetes     A1C= 5.75 on 25       Past Surgical History:   Procedure Laterality Date    COLONOSCOPY      MOUTH SURGERY  2023    dental procedure    TUBAL LIGATION  2019    Tubal ligation bilateral       Family History   Problem Relation Name Age of Onset    Multiple  "myeloma Mother      Stroke Father      Heart attack Father      Other (enlarged heart) Father      Breast cancer Sister 6     Heart disease Sister 6     Diabetes type II Sister 6     No Known Problems Brother 5     Endometriosis Daughter 1     No Known Problems Son 1     Diabetes Maternal Grandmother      Cancer Maternal Grandfather         Allergies   Allergen Reactions    Codeine Hallucinations       Social History     Tobacco Use   Smoking Status Never   Smokeless Tobacco Never     Social History     Substance and Sexual Activity   Alcohol Use Not Currently    Alcohol/week: 2.0 standard drinks of alcohol    Types: 2 Glasses of wine per week     Social History     Substance and Sexual Activity   Drug Use Never       Current Outpatient Medications   Medication Instructions    atorvastatin (LIPITOR) 20 mg, oral, Daily, as directed.   Appointment needed for refills.    cholecalciferol (VITAMIN D3) 50 mcg, oral, Daily    dilTIAZem CD (CARDIZEM CD) 120 mg, oral, Daily    ibuprofen 400 mg, Every 6 hours PRN    metFORMIN XR (GLUCOPHAGE-XR) 500 mg, oral, Daily with breakfast, Do not crush, chew, or split.    omeprazole (PRILOSEC) 20 mg, oral, Daily    sertraline (ZOLOFT) 200 mg, oral, Daily       PAT ROS:   Constitutional:   neg    Neuro/Psych:    nervous/anxious  Eyes:   neg    Ears:   neg    Nose:   neg    Mouth:    Edentulous   Throat:   neg    Neck:   neg    Cardio:   neg    Respiratory:   neg    Endocrine:   neg    GI:   neg    :   neg    Musculoskeletal:    Right breast pain- mild   Hematologic:   neg    Skin:  neg        Physical Exam  Vitals reviewed. Physical exam within normal limits.          PAT AIRWAY:   Airway:     Mallampati::  II    Neck ROM::  Full   Edentulous         Visit Vitals  /89   Pulse 80   Temp 36.5 °C (97.7 °F)   Ht 1.7 m (5' 6.93\")   Wt 99 kg (218 lb 4.1 oz)   LMP 11/21/2024 (Approximate)   SpO2 97%   BMI 34.26 kg/m²   OB Status Postmenopausal   Smoking Status Never   BSA 2.16 m² "       Assessment and Plan:       Patient is a 54 year old female scheduled for right breast lumpectomy with magseed localization- right axillary sentinel lymph node biopsy with Dr. Maxwell on 7/17/25.      Plan    Neuro:    Anxiety/depression- continue Zoloft    Cardiovascular:    RCRI: 0 Risk of Mace: 0.4%    Caprini: 4  VTE risk: 1.9%    Patient denies any chest pain, tightness, heaviness, pressure, radiating pain, palpitations, irregular heartbeats, lightheadedness, cough, congestion, shortness of breath, ANNE, PND, near syncope, weight loss or gain.    Good functional capacity  Functional 4 Mets. Patient denies SOB walking up 2 flights of stairs      EKG in PAT   Encounter Date: 07/14/25   ECG 12 Lead   Result Value    Ventricular Rate 82    Atrial Rate 82    UT Interval 186    QRS Duration 90    QT Interval 392    QTC Calculation(Bazett) 457    P Axis 54    R Axis 28    T Axis 47    QRS Count 14    Q Onset 219    P Onset 126    P Offset 182    T Offset 415    QTC Fredericia 435    Narrative    Normal sinus rhythm  Possible Anterior infarct , age undetermined  Abnormal ECG  No previous ECGs available  Confirmed by Mychal Shah ([id number]) on 7/14/2025 11:03:12 AM     HTN- continue Cardizem    HLD- continue statin      Pulmonary:    Stop Bang score is 3 placing patient at low risk for KARAN  ARISCAT: 26-44 points, 13.3% risk of in-hospital postoperative pulmonary complication  PRODIGY: Moderate risk for opioid induced respiratory depression    Renal/endo:  Recommendations to avoid nephrotoxic drugs and carefully monitor fluid status to maintain euvolemia. Use dose adjusted medications as needed for the underlying level of renal function.    Prediabetes- will hold Metformin DOS    Lab Results   Component Value Date    HGBA1C 5.7 (H) 07/02/2025       Heme:  Patient instructed to ambulate as soon as possible postoperatively to decrease thromboembolic risk.    Initiate mechanical DVT prophylaxis as soon as possible and initiate  chemical prophylaxis when deemed safe from a bleeding standpoint post surgery.        Risk assessment complete.  This patient is LOW risk candidate undergoing LOW risk procedure, patient is medically optimized for surgery.        Labs/testing obtained in PAT on 7/14/25: EKG. Reviewed last CBC, BMP    Lab Results   Component Value Date    WBC 3.7 (L) 06/30/2025    HGB 12.7 06/30/2025    HCT 39.9 06/30/2025    MCV 94.3 06/30/2025     06/30/2025     Lab Results   Component Value Date    GLUCOSE 78 07/02/2025    CALCIUM 9.3 07/02/2025     07/02/2025    K 4.4 07/02/2025    CO2 23 07/02/2025     07/02/2025    BUN 7 07/02/2025    CREATININE 0.63 07/02/2025       Follow up/communication: none      Preoperative medication instructions were provided and reviewed with the patient.  Any additional testing or evaluation was explained to the patient.  Nothing by mouth instructions were discussed and patient's questions were answered prior to conclusion to this encounter.  Patient verbalized understanding of preoperative instructions given in preadmission testing; discharge instructions available in EMR.    This note was dictated with speech recognition.  Minor errors may have been detected during use of speech recognition.    Charge not submitted as CPM/PAT visit within 72 hours of scheduled surgery.

## 2025-07-14 NOTE — CPM/PAT NURSE NOTE
CPM/PAT Nurse Note      Name: Jud Colunga)  /Age: 1970/54 y.o.       Medical History[1]    Surgical History[2]    Patient  reports that she is not currently sexually active.    Family History[3]    Allergies[4]    Prior to Admission medications    Medication Sig Start Date End Date Taking? Authorizing Provider   atorvastatin (Lipitor) 20 mg tablet Take 1 tablet (20 mg) by mouth once daily. as directed.   Appointment needed for refills. 25 Yes America Quiles PA-C   cholecalciferol (Vitamin D3) 50 mcg (2,000 units) capsule Take 1 capsule (50 mcg) by mouth once daily. 25 Yes America Quiles PA-C   dilTIAZem CD (Cardizem CD) 120 mg 24 hr capsule Take 1 capsule (120 mg) by mouth once daily. 25 Yes America Quiles PA-C   ibuprofen 400 mg tablet Take 1 tablet (400 mg) by mouth every 6 hours if needed for moderate pain (4 - 6).   Yes Historical Provider, MD   metFORMIN XR (Glucophage-XR) 500 mg 24 hr tablet Take 1 tablet (500 mg) by mouth once daily with breakfast. Do not crush, chew, or split. 25 Yes America Quiles PA-C   omeprazole (PriLOSEC) 20 mg DR capsule Take 1 capsule (20 mg) by mouth once daily. 25 Yes America Quiles PA-C   sertraline (Zoloft) 100 mg tablet Take 2 tablets (200 mg) by mouth once daily. 25 Yes DREW Wood ROS     DASI Risk Score    No data to display       Caprini DVT Assessment    No data to display       Modified Frailty Index    No data to display       [id number] Stroke Risk Points  Current as of just now        2 0 to 9 Points:      No Change          The [id number] risk score (Lip GH, et al. 2009. © 2010 American [employer]) quantifies the risk of stroke for a patient with atrial fibrillation. For patients without atrial fibrillation or under the age of 18 this score appears as N/A. Higher score values generally indicate higher risk of stroke.           Points Metrics   0 Has Congestive Heart Failure:  No     Patients with congestive heart failure get 1 point.    Current as of just now   1 Has Hypertension:  Yes     Patients with hypertension get 1 point.    Current as of just now   0 Age:  54     Patients 65 to 74 years old get 1 point, or patients 75 years and older get 2 points.    Current as of just now   0 Has Diabetes:  No     Patients with diabetes get 1 point.    Current as of just now   0 Had Stroke:  No  Had TIA:  No  Had Thromboembolism:  No     Patients who have had a stroke, TIA, or thromboembolism get 2 points.    Current as of just now   0 Has Vascular Disease:  No     Patients with vascular disease get 1 point.    Current as of just now   1 Clinically Relevant Sex:  Female     Patients with a clinically relevant sex of Female get 1 point.    Current as of just now             Revised Cardiac Risk Index    No data to display       Apfel Simplified Score    No data to display       Risk Analysis Index Results This Encounter    No data found in the last 10 encounters.       Prodigy: High Risk  Total Score: 0          ARISCAT Score for Postoperative Pulmonary Complications    No data to display       Angulo Perioperative Risk for Myocardial Infarction or Cardiac Arrest (ALINE)    No data to display         Nurse Plan of Action:     After Visit Summary (AVS) reviewed and patient verbalized good understanding of medications and NPO instructions.              [1]   Past Medical History:  Diagnosis Date    Anxiety     Atrial flutter (Multi) 01/12/2019    Afib 2019-brief episode that spontaneously terminated with no further issues, sees Dr. Montesinos at [facility] as needed, raj in [place] 8/15/23    Dizziness 07/15/2021    no episodes in years    Dysuria 04/03/2024    GERD (gastroesophageal reflux disease)     History of echocardiogram 2021    History of right breast cancer     HLD (hyperlipidemia)     HTN (hypertension)     Hypertension 04/03/2024     Pre-diabetes     A1C= 5.75 on 7/2/25   [2]   Past Surgical History:  Procedure Laterality Date    COLONOSCOPY      MOUTH SURGERY  09/18/2023    dental procedure    TUBAL LIGATION  09/11/2019    Tubal ligation bilateral   [3]   Family History  Problem Relation Name Age of Onset    Multiple myeloma Mother      Stroke Father      Heart attack Father      Other (enlarged heart) Father      Breast cancer Sister 6     Heart disease Sister 6     Diabetes type II Sister 6     No Known Problems Brother 5     Endometriosis Daughter 1     No Known Problems Son 1     Diabetes Maternal Grandmother      Cancer Maternal Grandfather     [4]   Allergies  Allergen Reactions    Codeine Hallucinations

## 2025-07-14 NOTE — Clinical Note
The site was marked. Prepped: right chest. Prepped with: ChloraPrep. The site was clipped. The patient was draped. Right breast x 2 areas only.

## 2025-07-14 NOTE — CPM/PAT H&P
[facility]/PAT Evaluation       Name: Jud Colunga (Jud Colunga)  /Age: 1970/54 y.o.     In-Person         Date of Consult: 25    Referring Provider: Dr. Maxwell      Date, Surgery, and Length: 25, right breast lumpectomy with magseed localization- right axillary sentinel lymph node biopsy, 115 minutes        Patient presents to [facility] for perioperative risk assessment prior to scheduled surgery. 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        Pt denies any past history of anesthetic complications such as PONV, awareness, prolonged sedation, dental damage, aspiration, cardiac arrest, difficult intubation, difficult I.V. access or unexpected hospital admissions. No history of malignant hyperthermia and or pseudocholinesterase deficiency.    No history of blood transfusions.    The patient IS NOT a [employer] and will accept blood and blood products if medically indicated.     Type and screen NOT sent.      Past Medical History:   Diagnosis Date    Anxiety     Atrial flutter (Multi) 2019    Afib 2019-brief episode that spontaneously terminated with no further issues, sees Dr. Montesinos at [facility] as needed, raj in [place] 8/15/23    Dizziness 07/15/2021    no episodes in years    Dysuria 2024    GERD (gastroesophageal reflux disease)     History of echocardiogram     History of right breast cancer     HLD (hyperlipidemia)     HTN (hypertension)     Hypertension 2024    Pre-diabetes     A1C= 5.75 on 25       Past Surgical History:   Procedure Laterality Date    COLONOSCOPY      MOUTH SURGERY  2023    dental procedure    TUBAL LIGATION  2019    Tubal ligation bilateral       Family History   Problem Relation Name Age of Onset    Multiple  "myeloma Mother      Stroke Father      Heart attack Father      Other (enlarged heart) Father      Breast cancer Sister 6     Heart disease Sister 6     Diabetes type II Sister 6     No Known Problems Brother 5     Endometriosis Daughter 1     No Known Problems Son 1     Diabetes Maternal Grandmother      Cancer Maternal Grandfather         Allergies   Allergen Reactions    Codeine Hallucinations       Social History     Tobacco Use   Smoking Status Never   Smokeless Tobacco Never     Social History     Substance and Sexual Activity   Alcohol Use Not Currently    Alcohol/week: 2.0 standard drinks of alcohol    Types: 2 Glasses of wine per week     Social History     Substance and Sexual Activity   Drug Use Never       Current Outpatient Medications   Medication Instructions    atorvastatin (LIPITOR) 20 mg, oral, Daily, as directed.   Appointment needed for refills.    cholecalciferol (VITAMIN D3) 50 mcg, oral, Daily    dilTIAZem CD (CARDIZEM CD) 120 mg, oral, Daily    ibuprofen 400 mg, Every 6 hours PRN    metFORMIN XR (GLUCOPHAGE-XR) 500 mg, oral, Daily with breakfast, Do not crush, chew, or split.    omeprazole (PRILOSEC) 20 mg, oral, Daily    sertraline (ZOLOFT) 200 mg, oral, Daily       PAT ROS:   Constitutional:   neg    Neuro/Psych:    nervous/anxious  Eyes:   neg    Ears:   neg    Nose:   neg    Mouth:    Edentulous   Throat:   neg    Neck:   neg    Cardio:   neg    Respiratory:   neg    Endocrine:   neg    GI:   neg    :   neg    Musculoskeletal:    Right breast pain- mild   Hematologic:   neg    Skin:  neg        Physical Exam  Vitals reviewed. Physical exam within normal limits.          PAT AIRWAY:   Airway:     Mallampati::  II    Neck ROM::  Full   Edentulous         Visit Vitals  /89   Pulse 80   Temp 36.5 °C (97.7 °F)   Ht 1.7 m (5' 6.93\")   Wt 99 kg (218 lb 4.1 oz)   LMP 11/21/2024 (Approximate)   SpO2 97%   BMI 34.26 kg/m²   OB Status Postmenopausal   Smoking Status Never   BSA 2.16 m² "       Assessment and Plan:       Patient is a 54 year old female scheduled for right breast lumpectomy with magseed localization- right axillary sentinel lymph node biopsy with Dr. Maxwell on 7/17/25.      Plan    Neuro:    Anxiety/depression- continue Zoloft    Cardiovascular:    RCRI: 0 Risk of Mace: 0.4%    Caprini: 4  VTE risk: 1.9%    Patient denies any chest pain, tightness, heaviness, pressure, radiating pain, palpitations, irregular heartbeats, lightheadedness, cough, congestion, shortness of breath, ANNE, PND, near syncope, weight loss or gain.    Good functional capacity  Functional 4 Mets. Patient denies SOB walking up 2 flights of stairs      EKG in PAT   Encounter Date: 07/14/25   ECG 12 Lead   Result Value    Ventricular Rate 82    Atrial Rate 82    WY Interval 186    QRS Duration 90    QT Interval 392    QTC Calculation(Bazett) 457    P Axis 54    R Axis 28    T Axis 47    QRS Count 14    Q Onset 219    P Onset 126    P Offset 182    T Offset 415    QTC Fredericia 435    Narrative    Normal sinus rhythm  Possible Anterior infarct , age undetermined  Abnormal ECG  No previous ECGs available  Confirmed by Mychal Shah ([id number]) on 7/14/2025 11:03:12 AM     HTN- continue Cardizem    HLD- continue statin      Pulmonary:    Stop Bang score is 3 placing patient at low risk for KARAN  ARISCAT: 26-44 points, 13.3% risk of in-hospital postoperative pulmonary complication  PRODIGY: Moderate risk for opioid induced respiratory depression    Renal/endo:  Recommendations to avoid nephrotoxic drugs and carefully monitor fluid status to maintain euvolemia. Use dose adjusted medications as needed for the underlying level of renal function.    Prediabetes- will hold Metformin DOS    Lab Results   Component Value Date    HGBA1C 5.7 (H) 07/02/2025       Heme:  Patient instructed to ambulate as soon as possible postoperatively to decrease thromboembolic risk.    Initiate mechanical DVT prophylaxis as soon as possible and initiate  chemical prophylaxis when deemed safe from a bleeding standpoint post surgery.        Risk assessment complete.  This patient is LOW risk candidate undergoing LOW risk procedure, patient is medically optimized for surgery.        Labs/testing obtained in PAT on 7/14/25: EKG. Reviewed last CBC, BMP    Lab Results   Component Value Date    WBC 3.7 (L) 06/30/2025    HGB 12.7 06/30/2025    HCT 39.9 06/30/2025    MCV 94.3 06/30/2025     06/30/2025     Lab Results   Component Value Date    GLUCOSE 78 07/02/2025    CALCIUM 9.3 07/02/2025     07/02/2025    K 4.4 07/02/2025    CO2 23 07/02/2025     07/02/2025    BUN 7 07/02/2025    CREATININE 0.63 07/02/2025       Follow up/communication: none      Preoperative medication instructions were provided and reviewed with the patient.  Any additional testing or evaluation was explained to the patient.  Nothing by mouth instructions were discussed and patient's questions were answered prior to conclusion to this encounter.  Patient verbalized understanding of preoperative instructions given in preadmission testing; discharge instructions available in EMR.    This note was dictated with speech recognition.  Minor errors may have been detected during use of speech recognition.    Charge not submitted as CPM/PAT visit within 72 hours of scheduled surgery.

## 2025-07-14 NOTE — PREPROCEDURE INSTRUCTIONS
Medication List            Accurate as of July 14, 2025  8:34 AM. Always use your most recent med list.                atorvastatin 20 mg tablet  Commonly known as: Lipitor  Take 1 tablet (20 mg) by mouth once daily. as directed.   Appointment needed for refills.  Medication Adjustments for Surgery: Take/Use as prescribed     cholecalciferol 50 mcg (2,000 units) capsule  Commonly known as: Vitamin D3  Take 1 capsule (50 mcg) by mouth once daily.  Medication Adjustments for Surgery: Do Not take on the morning of surgery     dilTIAZem  mg 24 hr capsule  Commonly known as: Cardizem CD  Take 1 capsule (120 mg) by mouth once daily.  Medication Adjustments for Surgery: Take/Use as prescribed     ibuprofen 400 mg tablet  Additional Medication Adjustments for Surgery: Other (Comment)  Notes to patient: Stop today 7/14/25 if not already stopped     metFORMIN  mg 24 hr tablet  Commonly known as: Glucophage-XR  Take 1 tablet (500 mg) by mouth once daily with breakfast. Do not crush, chew, or split.  Medication Adjustments for Surgery: Do Not take on the morning of surgery     omeprazole 20 mg DR capsule  Commonly known as: PriLOSEC  Take 1 capsule (20 mg) by mouth once daily.  Medication Adjustments for Surgery: Take/Use as prescribed     sertraline 100 mg tablet  Commonly known as: Zoloft  Take 2 tablets (200 mg) by mouth once daily.  Medication Adjustments for Surgery: Take/Use as prescribed                Preoperative Deep Breathing Exercises  Why it is important to do deep breathing exercises before my surgery?  Deep breathing exercises strengthen your breathing muscles.  This helps you to recover after your surgery and decreases the chance of breathing complications.  How are the deep breathing exercises done?  Sit straight with your back supported.  Breathe in deeply and slowly through your nose. Your lower rib cage should expand and your abdomen may move forward.  Hold that breath for 3 to 5  seconds.  Breathe out through pursed lips, slowly and completely.  Rest and repeat 10 times every hour while awake.  Rest longer if you become dizzy or lightheaded.        CONTACT SURGEON'S OFFICE IF YOU DEVELOP:  * Fever = 100.4 F   * New respiratory symptoms (e.g. cough, shortness of breath, respiratory distress, sore throat)  * Recent loss of taste or smell  *Flu like symptoms such as headache, fatigue or gastrointestinal symptoms  * You develop any open sores, shingles, burning or painful urination   AND/OR:  * You no longer wish to have the surgery.  * Any other personal circumstances change that may lead to the need to cancel or defer this surgery.  *You were admitted to any hospital within one week of your planned procedure.    SMOKING:  *Quitting smoking can make a huge difference to your health and recovery from surgery.    *If you need help with quitting, call 5-944-QUIT-NOW.    THE DAY OF SURGERY:  *Do not eat any food after midnight the night before your surgery.   *YOU MUST drink 14 OUNCES of clear liquids TWO hours before your instructed ARRIVAL TIME to the hospital. This includes water, black tea/coffee (no milk or cream), apple juice, clear broth and electrolyte drinks (Gatorade).  Please avoid clear liquids that are red in color.   *You may chew gum/mints up to TWO hours before your surgery/procedure.    SURGICAL TIME:  *You will be contacted between 2 p.m. and 6 p.m. the business day before your surgery with your arrival time.  *If you haven't received a call by 6pm, call [phone].  *Scheduled surgery times may change and you will be notified if this occurs-check your personal voicemail for any updates.    ON THE MORNING OF SURGERY:  *Wear comfortable, loose fitting clothing.   *Do not use moisturizers, creams, lotions or perfume.  *All jewelry and valuables should be left at home.  *Prosthetic devices such as contact lenses, hearing aids, dentures, eyelash extensions, hairpins and body  piercing must be removed before surgery.    BRING WITH YOU:  *Photo ID and insurance card  *Current list of medications and allergies  *Pacemaker/Defibrillator/Heart stent cards  *CPAP machine and mask  *Slings/splints/crutches  *Copy of your complete Advanced Directive/DHPOA-if applicable  *Neurostimulator implant remote    PARKING AND ARRIVAL:  *Check in at the Main Entrance desk and let them know you are here for surgery.  *You will be directed to the 2nd floor surgical waiting area.    IF YOU ARE HAVING OUTPATIENT/SAME DAY SURGERY:  *A responsible adult MUST accompany you at the time of discharge and stay with you for 24 hours after your surgery.  *You may NOT drive yourself home after surgery.  *You may use a taxi or ride sharing service ([employer], [employer]) to return home ONLY if you are accompanied by a friend or family member.  *Instructions for resuming your medications will be provided by your surgeon.

## 2025-07-16 ENCOUNTER — ANESTHESIA EVENT (OUTPATIENT)
Dept: OPERATING ROOM | Facility: HOSPITAL | Age: 55
End: 2025-07-16
Payer: COMMERCIAL

## 2025-07-17 ENCOUNTER — HOSPITAL ENCOUNTER (OUTPATIENT)
Facility: HOSPITAL | Age: 55
Setting detail: OUTPATIENT SURGERY
Discharge: HOME | End: 2025-07-17
Attending: SURGERY | Admitting: SURGERY
Payer: COMMERCIAL

## 2025-07-17 ENCOUNTER — ANESTHESIA (OUTPATIENT)
Dept: OPERATING ROOM | Facility: HOSPITAL | Age: 55
End: 2025-07-17
Payer: COMMERCIAL

## 2025-07-17 ENCOUNTER — PHARMACY VISIT (OUTPATIENT)
Dept: PHARMACY | Facility: CLINIC | Age: 55
End: 2025-07-17
Payer: COMMERCIAL

## 2025-07-17 ENCOUNTER — HOSPITAL ENCOUNTER (OUTPATIENT)
Dept: RADIOLOGY | Facility: HOSPITAL | Age: 55
Discharge: HOME | End: 2025-07-17
Payer: COMMERCIAL

## 2025-07-17 ENCOUNTER — HOSPITAL ENCOUNTER (OUTPATIENT)
Dept: RADIOLOGY | Facility: CLINIC | Age: 55
Discharge: HOME | End: 2025-07-17
Payer: COMMERCIAL

## 2025-07-17 VITALS
OXYGEN SATURATION: 97 % | TEMPERATURE: 96.4 F | HEIGHT: 67 IN | BODY MASS INDEX: 34.08 KG/M2 | SYSTOLIC BLOOD PRESSURE: 122 MMHG | WEIGHT: 217.15 LBS | RESPIRATION RATE: 16 BRPM | HEART RATE: 75 BPM | DIASTOLIC BLOOD PRESSURE: 77 MMHG

## 2025-07-17 DIAGNOSIS — R92.8 ABNORMAL FINDING ON BREAST IMAGING: Primary | ICD-10-CM

## 2025-07-17 DIAGNOSIS — D05.90 CARCINOMA IN SITU OF BREAST, UNSPECIFIED LATERALITY, UNSPECIFIED TYPE: ICD-10-CM

## 2025-07-17 DIAGNOSIS — C50.911 DUCTAL CARCINOMA IN SITU OF BREAST WITH MICROINVASIVE COMPONENT, RIGHT: ICD-10-CM

## 2025-07-17 PROCEDURE — 88341 IMHCHEM/IMCYTCHM EA ADD ANTB: CPT | Mod: TC,59,AHULAB | Performed by: SURGERY

## 2025-07-17 PROCEDURE — 3700000001 HC GENERAL ANESTHESIA TIME - INITIAL BASE CHARGE: Performed by: SURGERY

## 2025-07-17 PROCEDURE — 2720000007 HC OR 272 NO HCPCS: Performed by: SURGERY

## 2025-07-17 PROCEDURE — 88307 TISSUE EXAM BY PATHOLOGIST: CPT | Performed by: STUDENT IN AN ORGANIZED HEALTH CARE EDUCATION/TRAINING PROGRAM

## 2025-07-17 PROCEDURE — 38525 BIOPSY/REMOVAL LYMPH NODES: CPT | Performed by: SURGERY

## 2025-07-17 PROCEDURE — 76098 X-RAY EXAM SURGICAL SPECIMEN: CPT | Performed by: RADIOLOGY

## 2025-07-17 PROCEDURE — 2500000004 HC RX 250 GENERAL PHARMACY W/ HCPCS (ALT 636 FOR OP/ED): Performed by: ANESTHESIOLOGIST ASSISTANT

## 2025-07-17 PROCEDURE — 7100000001 HC RECOVERY ROOM TIME - INITIAL BASE CHARGE: Performed by: SURGERY

## 2025-07-17 PROCEDURE — 19301 PARTIAL MASTECTOMY: CPT | Performed by: SURGERY

## 2025-07-17 PROCEDURE — 3600000004 HC OR TIME - INITIAL BASE CHARGE - PROCEDURE LEVEL FOUR: Performed by: SURGERY

## 2025-07-17 PROCEDURE — 2500000005 HC RX 250 GENERAL PHARMACY W/O HCPCS: Performed by: SURGERY

## 2025-07-17 PROCEDURE — A9520 TC99 TILMANOCEPT DIAG 0.5MCI: HCPCS | Performed by: SURGERY

## 2025-07-17 PROCEDURE — 76098 X-RAY EXAM SURGICAL SPECIMEN: CPT | Performed by: SURGERY

## 2025-07-17 PROCEDURE — 7100000010 HC PHASE TWO TIME - EACH INCREMENTAL 1 MINUTE: Performed by: SURGERY

## 2025-07-17 PROCEDURE — 88342 IMHCHEM/IMCYTCHM 1ST ANTB: CPT | Performed by: STUDENT IN AN ORGANIZED HEALTH CARE EDUCATION/TRAINING PROGRAM

## 2025-07-17 PROCEDURE — 96372 THER/PROPH/DIAG INJ SC/IM: CPT | Performed by: SURGERY

## 2025-07-17 PROCEDURE — 3430000001 HC RX 343 DIAGNOSTIC RADIOPHARMACEUTICALS: Performed by: SURGERY

## 2025-07-17 PROCEDURE — 7100000002 HC RECOVERY ROOM TIME - EACH INCREMENTAL 1 MINUTE: Performed by: SURGERY

## 2025-07-17 PROCEDURE — 2500000004 HC RX 250 GENERAL PHARMACY W/ HCPCS (ALT 636 FOR OP/ED): Performed by: ANESTHESIOLOGY

## 2025-07-17 PROCEDURE — RXMED WILLOW AMBULATORY MEDICATION CHARGE

## 2025-07-17 PROCEDURE — 38792 RA TRACER ID OF SENTINL NODE: CPT | Performed by: INTERNAL MEDICINE

## 2025-07-17 PROCEDURE — 38792 RA TRACER ID OF SENTINL NODE: CPT | Performed by: SURGERY

## 2025-07-17 PROCEDURE — 3600000009 HC OR TIME - EACH INCREMENTAL 1 MINUTE - PROCEDURE LEVEL FOUR: Performed by: SURGERY

## 2025-07-17 PROCEDURE — 3700000002 HC GENERAL ANESTHESIA TIME - EACH INCREMENTAL 1 MINUTE: Performed by: SURGERY

## 2025-07-17 PROCEDURE — 2500000005 HC RX 250 GENERAL PHARMACY W/O HCPCS: Performed by: ANESTHESIOLOGIST ASSISTANT

## 2025-07-17 PROCEDURE — 2500000004 HC RX 250 GENERAL PHARMACY W/ HCPCS (ALT 636 FOR OP/ED): Mod: JW | Performed by: SURGERY

## 2025-07-17 PROCEDURE — 7100000009 HC PHASE TWO TIME - INITIAL BASE CHARGE: Performed by: SURGERY

## 2025-07-17 PROCEDURE — 38792 RA TRACER ID OF SENTINL NODE: CPT

## 2025-07-17 PROCEDURE — 76098 X-RAY EXAM SURGICAL SPECIMEN: CPT

## 2025-07-17 PROCEDURE — 88360 TUMOR IMMUNOHISTOCHEM/MANUAL: CPT | Performed by: STUDENT IN AN ORGANIZED HEALTH CARE EDUCATION/TRAINING PROGRAM

## 2025-07-17 PROCEDURE — 2500000001 HC RX 250 WO HCPCS SELF ADMINISTERED DRUGS (ALT 637 FOR MEDICARE OP): Performed by: ANESTHESIOLOGY

## 2025-07-17 PROCEDURE — 88341 IMHCHEM/IMCYTCHM EA ADD ANTB: CPT | Performed by: STUDENT IN AN ORGANIZED HEALTH CARE EDUCATION/TRAINING PROGRAM

## 2025-07-17 PROCEDURE — 38900 IO MAP OF SENT LYMPH NODE: CPT | Performed by: SURGERY

## 2025-07-17 RX ORDER — LIDOCAINE HYDROCHLORIDE 10 MG/ML
0.1 INJECTION, SOLUTION EPIDURAL; INFILTRATION; INTRACAUDAL; PERINEURAL ONCE
Status: DISCONTINUED | OUTPATIENT
Start: 2025-07-17 | End: 2025-07-17 | Stop reason: HOSPADM

## 2025-07-17 RX ORDER — FENTANYL CITRATE 50 UG/ML
INJECTION, SOLUTION INTRAMUSCULAR; INTRAVENOUS AS NEEDED
Status: DISCONTINUED | OUTPATIENT
Start: 2025-07-17 | End: 2025-07-17

## 2025-07-17 RX ORDER — PROPOFOL 10 MG/ML
INJECTION, EMULSION INTRAVENOUS AS NEEDED
Status: DISCONTINUED | OUTPATIENT
Start: 2025-07-17 | End: 2025-07-17

## 2025-07-17 RX ORDER — LIDOCAINE HYDROCHLORIDE 20 MG/ML
INJECTION, SOLUTION EPIDURAL; INFILTRATION; INTRACAUDAL; PERINEURAL AS NEEDED
Status: DISCONTINUED | OUTPATIENT
Start: 2025-07-17 | End: 2025-07-17

## 2025-07-17 RX ORDER — MIDAZOLAM HYDROCHLORIDE 2 MG/2ML
INJECTION, SOLUTION INTRAMUSCULAR; INTRAVENOUS AS NEEDED
Status: DISCONTINUED | OUTPATIENT
Start: 2025-07-17 | End: 2025-07-17

## 2025-07-17 RX ORDER — OXYCODONE HYDROCHLORIDE 5 MG/1
5 TABLET ORAL EVERY 4 HOURS PRN
Status: DISCONTINUED | OUTPATIENT
Start: 2025-07-17 | End: 2025-07-17 | Stop reason: HOSPADM

## 2025-07-17 RX ORDER — PHENYLEPHRINE HCL IN 0.9% NACL 1 MG/10 ML
SYRINGE (ML) INTRAVENOUS AS NEEDED
Status: DISCONTINUED | OUTPATIENT
Start: 2025-07-17 | End: 2025-07-17

## 2025-07-17 RX ORDER — TRAMADOL HYDROCHLORIDE 50 MG/1
50 TABLET, FILM COATED ORAL EVERY 8 HOURS PRN
Qty: 12 TABLET | Refills: 0 | Status: SHIPPED | OUTPATIENT
Start: 2025-07-17 | End: 2025-07-21

## 2025-07-17 RX ORDER — DROPERIDOL 2.5 MG/ML
0.62 INJECTION, SOLUTION INTRAMUSCULAR; INTRAVENOUS ONCE AS NEEDED
Status: DISCONTINUED | OUTPATIENT
Start: 2025-07-17 | End: 2025-07-17 | Stop reason: HOSPADM

## 2025-07-17 RX ORDER — ONDANSETRON HYDROCHLORIDE 2 MG/ML
4 INJECTION, SOLUTION INTRAVENOUS ONCE
Status: CANCELLED | OUTPATIENT
Start: 2025-07-17

## 2025-07-17 RX ORDER — ISOSULFAN BLUE 50 MG/5ML
INJECTION, SOLUTION SUBCUTANEOUS AS NEEDED
Status: DISCONTINUED | OUTPATIENT
Start: 2025-07-17 | End: 2025-07-17 | Stop reason: HOSPADM

## 2025-07-17 RX ORDER — SODIUM CHLORIDE 0.9 G/100ML
INJECTION, SOLUTION IRRIGATION AS NEEDED
Status: DISCONTINUED | OUTPATIENT
Start: 2025-07-17 | End: 2025-07-17 | Stop reason: HOSPADM

## 2025-07-17 RX ORDER — KETOROLAC TROMETHAMINE 30 MG/ML
INJECTION, SOLUTION INTRAMUSCULAR; INTRAVENOUS AS NEEDED
Status: DISCONTINUED | OUTPATIENT
Start: 2025-07-17 | End: 2025-07-17

## 2025-07-17 RX ORDER — ONDANSETRON HYDROCHLORIDE 2 MG/ML
INJECTION, SOLUTION INTRAVENOUS AS NEEDED
Status: DISCONTINUED | OUTPATIENT
Start: 2025-07-17 | End: 2025-07-17

## 2025-07-17 RX ORDER — ONDANSETRON 4 MG/1
4 TABLET, FILM COATED ORAL ONCE
Status: COMPLETED | OUTPATIENT
Start: 2025-07-17 | End: 2025-07-17

## 2025-07-17 RX ORDER — ONDANSETRON HYDROCHLORIDE 2 MG/ML
4 INJECTION, SOLUTION INTRAVENOUS ONCE AS NEEDED
Status: DISCONTINUED | OUTPATIENT
Start: 2025-07-17 | End: 2025-07-17 | Stop reason: HOSPADM

## 2025-07-17 RX ADMIN — Medication 100 MCG: at 09:42

## 2025-07-17 RX ADMIN — LIDOCAINE HYDROCHLORIDE 100 MG: 20 INJECTION, SOLUTION EPIDURAL; INFILTRATION; INTRACAUDAL; PERINEURAL at 09:22

## 2025-07-17 RX ADMIN — Medication 200 MCG: at 10:25

## 2025-07-17 RX ADMIN — ONDANSETRON HYDROCHLORIDE 4 MG: 4 TABLET, FILM COATED ORAL at 12:47

## 2025-07-17 RX ADMIN — PROPOFOL 25 MG: 10 INJECTION, EMULSION INTRAVENOUS at 10:08

## 2025-07-17 RX ADMIN — Medication 100 MCG: at 09:31

## 2025-07-17 RX ADMIN — OXYCODONE HYDROCHLORIDE 5 MG: 5 TABLET ORAL at 11:40

## 2025-07-17 RX ADMIN — Medication 100 MCG: at 10:16

## 2025-07-17 RX ADMIN — Medication 1 MILLICURIE: at 09:35

## 2025-07-17 RX ADMIN — DEXAMETHASONE SODIUM PHOSPHATE 8 MG: 4 INJECTION, SOLUTION INTRAMUSCULAR; INTRAVENOUS at 09:25

## 2025-07-17 RX ADMIN — CARBOXYMETHYLCELLULOSE SODIUM 2 DROP: 5 SOLUTION/ DROPS OPHTHALMIC at 09:25

## 2025-07-17 RX ADMIN — KETOROLAC TROMETHAMINE 30 MG: 30 INJECTION, SOLUTION INTRAMUSCULAR at 10:29

## 2025-07-17 RX ADMIN — FENTANYL CITRATE 50 MCG: 50 INJECTION, SOLUTION INTRAMUSCULAR; INTRAVENOUS at 09:22

## 2025-07-17 RX ADMIN — PROPOFOL 25 MG: 10 INJECTION, EMULSION INTRAVENOUS at 09:46

## 2025-07-17 RX ADMIN — FENTANYL CITRATE 50 MCG: 50 INJECTION, SOLUTION INTRAMUSCULAR; INTRAVENOUS at 10:38

## 2025-07-17 RX ADMIN — Medication 100 MCG: at 09:58

## 2025-07-17 RX ADMIN — Medication 100 MCG: at 10:21

## 2025-07-17 RX ADMIN — HYDROMORPHONE HYDROCHLORIDE 0.2 MG: 1 INJECTION, SOLUTION INTRAMUSCULAR; INTRAVENOUS; SUBCUTANEOUS at 11:19

## 2025-07-17 RX ADMIN — ONDANSETRON 4 MG: 2 INJECTION, SOLUTION INTRAMUSCULAR; INTRAVENOUS at 10:12

## 2025-07-17 RX ADMIN — SODIUM CHLORIDE, POTASSIUM CHLORIDE, SODIUM LACTATE AND CALCIUM CHLORIDE: 600; 310; 30; 20 INJECTION, SOLUTION INTRAVENOUS at 09:15

## 2025-07-17 RX ADMIN — MIDAZOLAM HYDROCHLORIDE 2 MG: 1 INJECTION, SOLUTION INTRAMUSCULAR; INTRAVENOUS at 09:15

## 2025-07-17 RX ADMIN — PROPOFOL 150 MG: 10 INJECTION, EMULSION INTRAVENOUS at 09:22

## 2025-07-17 RX ADMIN — FENTANYL CITRATE 50 MCG: 50 INJECTION, SOLUTION INTRAMUSCULAR; INTRAVENOUS at 09:46

## 2025-07-17 SDOH — HEALTH STABILITY: MENTAL HEALTH: CURRENT SMOKER: 0

## 2025-07-17 ASSESSMENT — PAIN SCALES - GENERAL
PAINLEVEL_OUTOF10: 2
PAIN_LEVEL: 4
PAINLEVEL_OUTOF10: 0 - NO PAIN
PAINLEVEL_OUTOF10: 7
PAINLEVEL_OUTOF10: 5 - MODERATE PAIN
PAINLEVEL_OUTOF10: 4
PAINLEVEL_OUTOF10: 0 - NO PAIN
PAINLEVEL_OUTOF10: 2
PAINLEVEL_OUTOF10: 2
PAINLEVEL_OUTOF10: 4
PAINLEVEL_OUTOF10: 5 - MODERATE PAIN
PAINLEVEL_OUTOF10: 5 - MODERATE PAIN

## 2025-07-17 ASSESSMENT — PAIN - FUNCTIONAL ASSESSMENT
PAIN_FUNCTIONAL_ASSESSMENT: 0-10

## 2025-07-17 ASSESSMENT — COLUMBIA-SUICIDE SEVERITY RATING SCALE - C-SSRS
6. HAVE YOU EVER DONE ANYTHING, STARTED TO DO ANYTHING, OR PREPARED TO DO ANYTHING TO END YOUR LIFE?: NO
2. HAVE YOU ACTUALLY HAD ANY THOUGHTS OF KILLING YOURSELF?: NO
1. IN THE PAST MONTH, HAVE YOU WISHED YOU WERE DEAD OR WISHED YOU COULD GO TO SLEEP AND NOT WAKE UP?: NO

## 2025-07-17 ASSESSMENT — PAIN DESCRIPTION - DESCRIPTORS
DESCRIPTORS: SHARP
DESCRIPTORS: SORE
DESCRIPTORS: SORE

## 2025-07-17 NOTE — INTERVAL H&P NOTE
H&P reviewed. The patient was examined and there are no changes to the H&P.  Patient denies fevers, chills, nausea, vomiting.    Plan for R breast lumpectomy with R axillary sentinel lymph node biopsy with Dr. Maxwell 7/17.    Rebekah Khan MD  PGY-2  Surgical Oncology

## 2025-07-17 NOTE — OP NOTE
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]     Procedures  Right Breast Lumpectomy with Magseed Localization  [id number] - ID MASTECTOMY PARTIAL    Right Axillary [place] Lymph Node Biopsy  [id number] - ID BX/EXC LYMPH NODE OPEN DEEP AXILLARY NODE      Surgeons      * Wendy Maxwell - Primary    Resident/Fellow/Other Assistant:  Surgeons and Role:     * Rebekah Khan MD - Resident - Assisting    Staff:   Circulator: Claudia Zhouub Person: Nadja  Circulator: Keturah Menjivar Person: Jazmyne Sharpe Scrub: Mialdis Sharpe Circulator: Araseli    Anesthesia Staff: Anesthesiologist: Annette Hall MD  C-AA: TAMRA Baldwin    Procedure Summary  Anesthesia: General  ASA: III  Estimated Blood Loss: 5mL  Intra-op Medications:   Administrations occurring from 0850 to 1045 on 25:   Medication Name Total Dose   isosulfan blue (Lumphazurin) 1 % injection 3 mL   sodium chloride 0.9 % irrigation solution 1,000 mL   bupivacaine PF 0.25 % (Marcaine) 0.25 % (2.5 mg/mL) 20 mL, lidocaine-epinephrine (Xylocaine W/EPI) 1 %-1:100,000 20 mL syringe 10 mL   dexAMETHasone (Decadron) 4 mg/mL IV Syringe 2 mL 8 mg   fentaNYL (Sublimaze) injection 50 mcg/mL 150 mcg   ketorolac (Toradol) injection 30 mg 30 mg   LR bolus Cannot be calculated   lidocaine PF (Xylocaine-MPF) local injection 2 % 100 mg   lubricating eye drops ophthalmic solution 2 drop   midazolam PF (Versed) injection 1 mg/mL 2 mg   ondansetron (Zofran) 2 mg/mL injection 4 mg   phenylephrine 100 mcg/mL syringe 10 mL (prefilled) 700 mcg   propofol (Diprivan)  injection 10 mg/mL 200 mg              Anesthesia Record               Intraprocedure I/O Totals       None           Specimen:   ID Type Source Tests Collected by Time   1 : Right Breast Lumpectomy with Magseed Localization - short superior, long lateral Tissue BREAST LUMPECTOMY RIGHT SURGICAL PATHOLOGY EXAM Wendy Maxwell MD 7/17/2025 0953   2 : Lateral margin - new lateral margin marked with clip and painted ORANGE Tissue BREAST MARGIN RIGHT SURGICAL PATHOLOGY EXAM Wendy Maxwell MD 7/17/2025 1009   3 : Anterior margin - new anterior margin marked with clip and painted GREEN Tissue BREAST MARGIN RIGHT SURGICAL PATHOLOGY EXAM Wendy Maxwell MD 7/17/2025 1009   4 : Posterior margin - new posterior margin marked with clip and painted BLACK Tissue BREAST MARGIN RIGHT SURGICAL PATHOLOGY EXAM Wendy Maxwell MD 7/17/2025 1010   5 : Medial margin - new medial margin marked with clip and painted YELLOW Tissue BREAST MARGIN RIGHT SURGICAL PATHOLOGY EXAM Wendy Maxwell MD 7/17/2025 1010   6 : Inferior margin - new inferior margin marked with clip and painted BLUE Tissue BREAST MARGIN RIGHT SURGICAL PATHOLOGY EXAM Wendy Maxwell MD 7/17/2025 1011   7 : Superior margin - new superior margin marked with clip and painted RED Tissue BREAST MARGIN RIGHT SURGICAL PATHOLOGY EXAM Wendy Maxwell MD 7/17/2025 1011   8 : Right Axillary [place] Lymph Node #1 Target Count 262 Tissue SENTINEL LYMPH NODE BREAST RIGHT SURGICAL PATHOLOGY EXAM Wendy Maxwell MD 7/17/2025 0953        Indications: Jud Colunga is an 54 y.o. 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     The patient was seen in the preoperative area. The risks, benefits, complications, treatment options, non-operative alternatives, expected recovery and outcomes were discussed with the patient. The possibilities of reaction to medication, pulmonary aspiration, injury to surrounding structures, bleeding, recurrent  infection, the need for additional procedures, failure to diagnose a condition, and creating a complication requiring transfusion or operation were discussed with the patient. The patient concurred with the proposed plan, giving informed consent.  The site of surgery was properly noted/marked if necessary per policy. The patient has been actively warmed in preoperative area. Preoperative antibiotics are not indicated. Venous thrombosis prophylaxis have been ordered including bilateral sequential compression devices    Procedure Details:   Operative indications: The patient had a mammogram showing calcifications in the right breast. A core biopsy showed ductal carcinoma in situ.  She had 2 additional biopsies.  The more anterior biopsy was benign and the more posterior biopsy showed ductal carcinoma in situ.  Surgical options were reviewed in detail with the patient. The patient chose to proceed with a lumpectomy with bracketed Magseed localization and axillary sentinel lymph node biopsy. The risks, benefits and procedure were discussed with the patient including bleeding, infection, scar tissue formation, decreased function, mobility, or strength of the upper extremity, pain and numbness of the axilla and upper arm, lymphedema and general anesthesia. She understood all risks and agreed to proceed.     Operative report: The patient was taken to the operating room and placed on the table in the supine position. A timeout was performed. The site was marked preoperatively. She received general anesthesia without complication.  Once in the operating room and after anesthesia was obtained, the patient had the injection of technetium 99m tilmanocept lymphoseek into 2 perireolar areas of the right breast and the injection of 4 cc of Lymphazurin into 4 periareolar areas of the right breast and massaged into the breast for 5 minutes. The sentimag probe was used to identify the Magseed in the breast. The patient was prepped  and draped in the usual sterile fashion. The lumpectomy was performed. Local anesthesia was obtained and then an incision was made in the radial lateral right breast. Dissection continued with Metzenbaum scissors and a Bovie electrocautery. The sentimag probe again was used to identify the anterior Magseed. The area of tissue surrounding the anterior Magseed was grasped with an Allis clamp. The tissue was removed using Metzenbaum scissors and Bovie electrocautery.  Dissection continued deeply and laterally until the second Magseed was identified.  The more posterior tissue was also grasped with an Allis clamp and then the entire area of tissue was excised with Bovie electrocautery.  The specimen was labeled with a short stitch superiorly and a long stitch laterally. The sentimag probe was used to confirm that both Magseeds were present in the tissue specimen. The cavity was swept with the sentimag probe and no activity was noted. The specimen was labeled as right breast lumpectomy 9:00 and painted with the vector surgical margin marker kit. The specimen was radiographed using the MAINtag radiography system which confirmed that both Magseeds, 2 tissue marker clips and several calcifications were within the tissue specimen. The specimen was then sent to pathology. Shave margins were then taken from the lumpectomy cavity.  All tissue was excised in a similar fashion with an area of tissue grasped with an Allis clamp and dissection done with Metzenbaum scissors to excise the superior, lateral, inferior, medial, anterior and posterior margins.  Each margin was identified as a shave margin with the new true margin marked with a clip and painted with the appropriate color ink.  Each specimen was sent separately to pathology. Hemostasis was achieved with Bovie electrocautery. After adequate hemostasis, the wound was irrigated and the irrigation fluid was suctioned out. Clips were placed to chasidy the lumpectomy cavity. A  superficial, subcutaneous layer was closed with interrupted 3-0 Vicryl stitches. The skin was closed with a running subcuticular 4-0 monocryl stitch. A right axillary sentinel lymph node biopsy was performed.  Local anesthesia was obtained and then an incision was made with a 15 blade scalpel in the axilla inferior to the axillary hairline. Dissection was continued with the scalpel. The clavicopectoral fascia was incised, gaining entrance into the axilla. Blue lymphatics were identified and followed to a blue stained lymph node. This also showed activity with the neoprobe. This lymph node was  from the surrounding tissue. Small lymphatics and vessels were ligated with the LigaSure. This lymph node was identified as right axillary sentinel lymph node #1. Target count was 262. It was sent to pathology. At this point, there were no other blue stained lymph nodes, no further activity noted with the neoprobe and no abnormally palpated lymph nodes. After adequate hemostasis, the wound was irrigated and the irrigation fluid was suctioned out.  A subcutaneous layer was closed with interrupted 3-0 Vicryl stitches. The skin was closed with a running, subcuticular 4-0 monocryl stitch. Steri-Strips were placed on both incisions followed by fluffs and a Surgi-Bra. She tolerated the procedure well and transferred to PACU in stable condition.   Evidence of Infection: No   Complications:  None; patient tolerated the procedure well.    Disposition: PACU - hemodynamically stable.  Condition: stable     [place] Node Biopsy for Breast Cancer  Operation performed with curative intent Yes   Tracer(s) used to identify sentinel nodes in the upfront surgery (non-neoadjuvant) setting Dye and Radioactive tracer   Tracer(s) used to identify sentinel nodes in the neoadjuvant setting N/A   All nodes (colored or non-colored) present at the end of a dye-filled lymphatic channel were removed Yes   All significantly radioactive nodes were  removed Yes   All palpably suspicious nodes were removed Yes   Biopsy-proven positive nodes marked with clips prior to chemotherapy were identified and removed N/A         Additional Details: I personally reviewed the specimen radiograph intra-operatively.      Attending Attestation: I was present and scrubbed for the entire procedure.    Wendy Maxwell  Phone Number: [phone]

## 2025-07-17 NOTE — ANESTHESIA PROCEDURE NOTES
Airway  Date/Time: 7/17/2025 9:24 AM  Reason: elective    Airway not difficult    Staffing  Performed: [facility]   Authorized by: Annette Hall MD    Performed by: TAMRA Baldwin  Patient location during procedure: OR    Patient Condition  Indications for airway management: anesthesia  Patient position: sniffing  MILS maintained throughout  Sedation level: deep     Final Airway Details   Preoxygenated: yes  Final airway type: supraglottic airway  Successful airway:   Size: 4  Number of attempts at approach: 1    Additional Comments  SIZE 4 LMA I GEL

## 2025-07-17 NOTE — ANESTHESIA POSTPROCEDURE EVALUATION
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    Anesthesia Post Evaluation    Patient location during evaluation: PACU  Patient participation: complete - patient participated  Level of consciousness: awake  Pain score: 4  Pain management: adequate  Airway patency: patent  Cardiovascular status: acceptable  Respiratory status: acceptable  Hydration status: acceptable  Postoperative Nausea and Vomiting: none        There were no known notable events for this encounter.

## 2025-07-17 NOTE — ANESTHESIA PREPROCEDURE EVALUATION
"Patient: Jud Colunga    Procedure Information       Date/Time: 07/17/25 0850    Procedures:       Right Breast Lumpectomy with Magseed Localization (Right: Breast)      Right Axillary [place] Lymph Node Biopsy (Right: Axilla)    Location: [facility] OR 03 / Virtual [facility] OR    Surgeons: Wendy Maxwell MD            Relevant Problems   Cardiac   (+) Hyperlipidemia   (+) Hypertension   (+) Paroxysmal atrial fibrillation (Multi)      Neuro   (+) Anxiety      GI   (+) Gastroesophageal reflux disease      Endocrine   (+) Obesity      ID   (+) Oral infection       Clinical information reviewed:                    Medical History[1]   Surgical History[2]  Social History[3]   Current Outpatient Medications   Medication Instructions    atorvastatin (LIPITOR) 20 mg, oral, Daily, as directed.   Appointment needed for refills.    cholecalciferol (VITAMIN D3) 50 mcg, oral, Daily    dilTIAZem CD (CARDIZEM CD) 120 mg, oral, Daily    ibuprofen 400 mg, Every 6 hours PRN    metFORMIN XR (GLUCOPHAGE-XR) 500 mg, oral, Daily with breakfast, Do not crush, chew, or split.    omeprazole (PRILOSEC) 20 mg, oral, Daily    sertraline (ZOLOFT) 200 mg, oral, Daily      RX Allergies[4]     Chemistry    Lab Results   Component Value Date/Time     07/02/2025 0921    K 4.4 07/02/2025 0921     07/02/2025 0921    CO2 23 07/02/2025 0921    BUN 7 07/02/2025 0921    CREATININE 0.63 07/02/2025 0921    Lab Results   Component Value Date/Time    CALCIUM 9.3 07/02/2025 0921    ALKPHOS 97 07/02/2025 0921    AST 27 07/02/2025 0921    ALT 11 07/02/2025 0921    BILITOT 0.3 07/02/2025 0921          Lab Results   Component Value Date    HGBA1C 5.7 (H) 07/02/2025     Lab Results   Component Value Date/Time    WBC 3.7 (L) 06/30/2025 0819    HGB 12.7 06/30/2025 0819    HCT 39.9 06/30/2025 0819     06/30/2025 0819     No results found for: \"PROTIME\", \"PTT\", \"INR\"  Encounter Date: 07/14/25   ECG 12 Lead   Result Value    Ventricular Rate 82    Atrial Rate " 82    MT Interval 186    QRS Duration 90    QT Interval 392    QTC Calculation(Bazett) 457    P Axis 54    R Axis 28    T Axis 47    QRS Count 14    Q Onset 219    P Onset 126    P Offset 182    T Offset 415    QTC Fredericia 435    Narrative    Normal sinus rhythm  Possible Anterior infarct , age undetermined  Abnormal ECG  No previous ECGs available  Confirmed by Mychal Shah ([id number]) on 7/14/2025 11:03:12 AM      No results found for this or any previous visit from the past 1095 days.        Visit Vitals  LMP 11/21/2024 (Approximate)   OB Status Postmenopausal   Smoking Status Never     No data recorded    Physical Exam    Airway  Mallampati: I  TM distance: >3 FB  Neck ROM: full  Mouth opening: 3 or more finger widths     Cardiovascular   Rhythm: regular     Dental - normal exam     Pulmonary Breath sounds clear to auscultation     Abdominal            Anesthesia Plan    History of general anesthesia?: yes  History of complications of general anesthesia?: no    ASA 3     general     The patient is not a current smoker.    intravenous induction   Postoperative pain plan includes opioids.  Trial extubation is planned.  Anesthetic plan and risks discussed with patient.              [1]   Past Medical History:  Diagnosis Date    Anxiety     Atrial flutter (Multi) 01/12/2019    Afib 2019-brief episode that spontaneously terminated with no further issues, sees Dr. Montesinos at [facility] as needed, lovn in [place] 8/15/23    Dizziness 07/15/2021    no episodes in years    Dysuria 04/03/2024    GERD (gastroesophageal reflux disease)     History of echocardiogram 2021    History of right breast cancer     HLD (hyperlipidemia)     HTN (hypertension)     Hypertension 04/03/2024    Pre-diabetes     A1C= 5.75 on 7/2/25   [2]   Past Surgical History:  Procedure Laterality Date    COLONOSCOPY      MOUTH SURGERY  09/18/2023    dental procedure    TUBAL LIGATION  09/11/2019    Tubal ligation bilateral   [3]   Social History  Tobacco Use     Smoking status: Never    Smokeless tobacco: Never   Vaping Use    Vaping status: Never Used   Substance Use Topics    Alcohol use: Not Currently     Alcohol/week: 2.0 standard drinks of alcohol     Types: 2 Glasses of wine per week    Drug use: Never   [4]   Allergies  Allergen Reactions    Codeine Hallucinations

## 2025-07-18 ASSESSMENT — PAIN SCALES - GENERAL: PAINLEVEL_OUTOF10: 7

## 2025-07-25 DIAGNOSIS — C50.911 DUCTAL CARCINOMA IN SITU OF BREAST WITH MICROINVASIVE COMPONENT, RIGHT: ICD-10-CM

## 2025-07-28 ENCOUNTER — RESULTS FOLLOW-UP (OUTPATIENT)
Dept: SURGICAL ONCOLOGY | Facility: CLINIC | Age: 55
End: 2025-07-28
Payer: COMMERCIAL

## 2025-07-28 LAB
LAB AP ASR DISCLAIMER: NORMAL
LAB AP BLOCK FOR ADDITIONAL STUDIES: NORMAL
LABORATORY COMMENT REPORT: NORMAL
PATH REPORT.COMMENTS IMP SPEC: NORMAL
PATH REPORT.FINAL DX SPEC: NORMAL
PATH REPORT.GROSS SPEC: NORMAL
PATH REPORT.RELEVANT HX SPEC: NORMAL
PATH REPORT.TOTAL CANCER: NORMAL
PATHOLOGY SYNOPTIC REPORT: NORMAL

## 2025-07-28 NOTE — TELEPHONE ENCOUNTER
Result Communication    Resulted Orders   Surgical Pathology Exam   Result Value Ref Range    Case Report       Surgical Pathology                                Case: [id number]                                  Authorizing Provider:  Wendy Maxwell MD            Collected:           07/17/2025 0953              Ordering Location:     [facility] OR Received:            07/17/2025 1223              Pathologist:           Chaparro Phillips MD PhD                                                            Specimens:   A) - BREAST LUMPECTOMY RIGHT, Right Breast Lumpectomy with Magseed Localization -                   short superior, long lateral                                                                        B) - BREAST MARGIN RIGHT, Lateral margin - new lateral margin marked with clip and                  painted ORANGE                                                                                      C) - BREAST MARGIN RIGHT, Anterior margin - new anterior margin marked with clip and                painted GREEN                                                                                        D) - BREAST MARGIN RIGHT, Posterior margin - new posterior margin marked with clip                  and painted BLACK                                                                                   E) - BREAST MARGIN RIGHT, Medial margin - new medial margin marked with clip and                    painted YELLOW                                                                                      F) - BREAST MARGIN RIGHT, Inferior margin - new inferior margin marked with clip and                painted BLUE                                                                                        G) - BREAST MARGIN RIGHT, Superior margin - new superior margin marked with clip and                painted RED                                                                                         H) - SENTINEL LYMPH  NODE BREAST RIGHT, Right Axillary [place] Lymph Node #1 Target                 Count 262                                                                                  FINAL DIAGNOSIS       A. Right breast, lumpectomy:   -- No residual invasive carcinoma (see comment and synoptic report)  -- Ductal carcinoma in situ    -- Biopsy site changes x 2     B. Right breast new lateral margin, excision:   -- Breast tissue, negative for carcinoma     C. Right breast new anterior margin, excision:  -- Breast tissue, negative for carcinoma     D. Right breast new posterior margin, excision:  -- Breast tissue, negative for carcinoma     E. Right breast new medial margin, excision:   -- Breast tissue, negative for carcinoma     F. Right breast new inferior margin, excision:  -- Breast tissue, negative for carcinoma     G. Right breast new superior margin, excision:   -- Breast tissue, negative for carcinoma     H. Right axillary sentinel lymph node #1 target count 262, biopsy:    -- One lymph node, negative for carcinoma (0/1) (see comment)     Chaparro Phillips MD, PhD                 By the signature on this report, the individual or group listed as making the Final Interpretation/Diagnosis certifies that they have reviewed this case.       Comment       The right breast lumpectomy (specimen A) is entirely submitted for pathologic evaluation. Sections of the lumpectomy reveal scattered ductal carcinoma in situ, high nuclear grade, solid pattern, with comedonecrosis, in slices 8-11 (A11, A13, A16 and A18), measuring 16 mm in greatest dimension. In the main lumpectomy, all tissue edges are negative for in situ carcinoma. In situ carcinoma is <1 mm to the closest posterior tissue edge (A16), and >2 mm to the remaining tissue edges.    The separately submitted new lateral, anterior, posterior, medial, inferior and superior margins (specimens B-G) are negative for carcinoma.     SMMHC and p63 immunostains performed on blocks A13, A16  "and A18 demonstrate the presence of myoepithelial cell layer, supporting the diagnosis of in situ carcinoma. HER2 immunostain performed on blocks A8, A13, A16, A18 and A19 highlights in situ carcinoma cells. E-cadherin stain performed on block A18 shows intact membranous expression, supporting ductal phenotype.     HER2 stain performed on block H1 is negative, providing no evidence of metastatic carcinoma in the sentinel lymph node.    No definitive Invasive carcinoma is identified in the lumpectomy, supported by myoepithelial markers and HER stains. However, there is microinvasion in the prior biopsy specimen ([id number]). Microinvasive carcinoma is reported in the synoptic reported based on the prior biopsy.    Representative slides (H1 and prior biopsy [id number] slide A2) were reviewed at the [facility] Breast Consensus Conference via Zoom on 7/28/2025.          Case Summary Report       INVASIVE CARCINOMA OF THE BREAST: Resection   INVASIVE CARCINOMA OF THE BREAST: RESECTION - All Specimens   8th Edition - Protocol posted: 6/19/2024      SPECIMEN      Procedure:    Excision (less than total mastectomy)      Specimen Laterality:    Right      TUMOR      Histologic Type:    Micro-invasive carcinoma        Histologic Type Comment:    in prior biopsy ([id number] A2)      Tumor Size:    Microinvasion only (less than or equal to 1 mm)      Tumor Focality:    Single focus of invasive carcinoma      Ductal Carcinoma In Situ (DCIS):    Present        :    Positive for extensive intraductal component (EIC)        Size (Extent) of DCIS:    Estimated size (extent) of DCIS is at least (Millimeters): 16 mm        Architectural Patterns:    Solid        Nuclear Grade:    Grade III (high)        Necrosis:    Present, central (expansive \"comedo\" necrosis)      Lymphatic and / or Vascular Invasion:    Not identified      Dermal Lymphatic and / or Vascular Invasion:    No skin present      Microcalcifications:    Present in " "non-neoplastic tissue      Treatment Effect in the Breast:    No known presurgical therapy      MARGINS      Margin Status for DCIS:    All margins negative for DCIS        Distance from DCIS to Closest Margin:    Greater than: 2 mm      Margin Comment:    The separately submitted new lateral, anterior, posterior, medial, inferior and superior margins (specimens B-G) are assumed to be the final margins of the lumpectomy (specimen A) and reported in the synoptic report.      REGIONAL LYMPH NODES      Regional Lymph Node Status:            :    All regional lymph nodes negative for tumor        Total Number of Lymph Nodes Examined (sentinel and non-sentinel):    1        Number of Tram Nodes Examined:    1      pTNM CLASSIFICATION (AJCC 8th Edition)      Reporting of pT, pN, and (when applicable) pM categories is based on information available to the pathologist at the time the report is issued. As per the AJCC (Chapter 1, 8th Ed.) it is the managing physician's responsibility to establish the final pathologic stage based upon all pertinent information, including but potentially not limited to this pathology report.      pT Category:    pT1mi      pN Category:    pN0      N Suffix:    (sn)      SPECIAL STUDIES      Estrogen Receptor (ER) Status:    Low Positive (1-10% of cells with nuclear positivity)      Progesterone Receptor (PgR) Status:    Positive        Percentage of Cells with Nuclear Positivity:    3 %      HER2 (by immunohistochemistry):    Positive (Score 3+)      Testing Performed on Case Number:    [id number] A2      Block for Additional Biomarkers/Molecular Studies       Normal Block: A3  Tumor Block: A13 (DCIS)      Clinical History       Pre-op diagnosis:  Ductal carcinoma in situ of breast with microinvasive component, right [C50.911]      Gross Description       A. Received in formalin, labeled with the patient's name and hospital number, and \"right breast lumpectomy with Magseed\" is an irregular " segment of yellow lobulated fibrofatty tissue, 7.0 (medial to lateral) x 5.0 (superior to inferior) x 2.5 (anterior to posterior) cm.  A skin ellipse is not present. The specimen is oriented with a short superior and a long lateral stitch. The specimen is previously inked in the following manner:  anterior green, posterior black, superior red, inferior blue, medial yellow, and lateral orange.The specimen is serially sectioned from medial to lateral into 17 slices.  The cut surface reveals an ill defined, hemorrhagic area  (lesion#1) with a gritty sensation in slices 5, 6 and 7, measuring 2.0 x 1.5 x 1.2 cm.  This area is abutting the anterior margin and is 0.2 cm from the posterior margin, 0.1 cm from the inferior margin, 0.3 cm from the superior margin, 1.6 cm from the medial margin, 4.0 cm from the lateral margin. Two Magseed are identified: The short one in slice 5, the longer one in slice 6.  A gel-filled biopsy cavity (cavity #1) is seen in slices 9 and 10, containing open coil biopsy clip in slice #9, measuring 0.8 x 0.7 x 0.5 cm.  This cavity is 0.3 cm from the closest posterior margin.  A second hemorrhagic area (lesion #2) with a gritty sensation is present in slices 12 and 13, measuring 1.5 x 1.2 x 1.0 cm.  This area is abutting the inferior and anterior margins, and is 0.7 cm of the posterior margin, 3.0 cm from the superior margin, 4.0 cm from the medial margin and 1.6 cm from the lateral margin.  The second hemorrhagic biopsy cavity (cavity #2) is present in slice #12, measuring 0.9 x 0.5 x 0.5 cm.  This biopsy cavity is 0.2 cm from the closest anterior margin.  The second biopsy clip is not identified.  The 2 lesions appears to be 1.6 cm apart.  The remainder of the breast parenchyma is unremarkable.  The specimen is entirely submitted in 27 cassettes.  XL    NOTE:  Ischemia time: 0955  This specimen was placed into formalin: Not provided     Summary of Cassettes:  Specimen Label Site  A  1 slice #1,  "perpendicular sections of the medial margin    2 slice #2    3 slice #3    4 slice #4    5 slice #5 with lesion #1 and a short Magseed, superior half    6 slice #5 with lesion#1, inferior half    7 slice #6, with lesion #1 and a long biopsy, superior half    8 slice #6 with lesion #1, inferior half    9 slice #7 with lesion #1, superior half    10 slice #7 with lesion #1, inferior half    11 Slice #8, superior half    12 slice #8, inferior 5    13 slice #9  with biopsy cavity #1 and biopsy clip, superior half    14 slice #9, inferior half    15 Slice #10 with biopsy cavity #1, superior half    16 slice #10, inferior half    17 Slice #11, superior half    18 Slice #11 inferior half    19 Slice #12, superior half  20 Slice #12  with lesion # 2 and biopsy cavity#2, inferior half  21 Slice #13, superior half  22 Slice #13 with lesion #2, inferior half  23 Slice #14, superior half  24 Slice #14, inferior half  25 Slice #15, bisected  26 Slice #16, bisected  27 Slice 17, perpendicular sections of the lateral margin    [facility]  Department of Pathology  [address] [place] [address]  Phone: [phone] Fax: [phone]       B.  Received in formalin, labeled with the patient's name and hospital number and \"new lateral margin\", is an irregular segment of yellow lobulated fibrofatty tissue, 2.5 x 1.8 x 1.2 cm.  The specimen is oriented with a clip, and  orange ink on the new margin.  The specimen is serially sectioned to reveal a homogeneous fatty cut surface.  No lesion is identified.  The specimen is entirely submitted in 3 cassette.  XL    [facility]  Department of Pathology  [address] [place] [address]  Phone: [phone] Fax: [phone]     C.  Received in formalin, labeled with the patient's name and hospital number and \"new anterior margin\", is an irregular segment of yellow lobulated fibrofatty tissue, 4.5 x 2.7 x 1.5 cm.  " "The specimen is oriented with a clip, and green ink on the new margin.  The specimen is serially sectioned to reveal a homogeneous fatty cut surface.  No lesion is identified.  The specimen is entirely submitted in 6 cassette.  [facility]  Department of Pathology  [address] [place] [address]  Phone: [phone] Fax: [phone]     D.  Received in formalin, labeled with the patient's name and hospital number and \"new posterior margin\", is an irregular segment of yellow lobulated fibrofatty tissue, 3.0 x 2.0 x 1.5 cm.  The specimen is oriented with a clip, black ink.   The specimen is serially sectioned to reveal a homogeneous fatty cut surface.  No lesion is identified.  The specimen is entirely submitted in 3 cassettes.  [facility]  Department of Pathology  [address] [place] [address]  Phone: [phone] Fax: [phone]     E.  Received in formalin, labeled with the patient's name and hospital number and \"new medial margin\", is an irregular segment of yellow lobulated fibrofatty tissue, 2.8 x 2.0 x 0.8 cm.  The specimen is oriented with a clip, and yellow ink on the new margin. The specimen is serially sectioned to reveal a homogeneous fatty cut surface.  No lesion is identified.  The specimen is entirely submitted in 2 cassettes.  [facility]  Department of Pathology  [address] [place] [address]  Phone: [phone] Fax: [phone]     F.  Received in formalin, labeled with the patient's name and hospital number and \"new inferior margin\", is an irregular segment of yellow lobulated fibrofatty tissue, 2.0 x 1.5 x 0.7 cm.  The specimen is oriented with a clip, blue ink on the new margin. The specimen is serially sectioned to reveal a homogeneous fibrotic cut surface.  No lesion is identified.  The specimen is entirely submitted in 2 " "cassettes.  XL    [facility]  Department of Pathology  [address] [place] [id number]  Phone: [phone] Fax: [phone]     G.  Received in formalin, labeled with the patient's name and hospital number and \"new superior margin\", is an irregular segment of yellow lobulated fibrofatty tissue, 3.0 x 2.2 x 0.7 cm.  The specimen is oriented with a clip, and red ink on the new margin. The specimen is serially sectioned to reveal a homogeneous fatty cut surface.  No lesion is identified.  The specimen is entirely submitted in 2 cassettes.  XL    [facility]  Department of Pathology  [address] [place] [address]  Phone: [phone] Fax: [phone]     H. Received  in formalin, labeled with the patient's name and hospital number and \"right axillary sentinel lymph node\", is a possible lymph node with attached adipose tissue measuring 2.1 x 1.5 x 0.8 cm.  The specimen is sectioned, revealing 1 lymph node, 1.0 cm in greatest dimension.  The lymph node is bisected and is entirely submitted in cassette H1.  Remaining adipose tissue is submitted in H2.  Specimen is entirely submitted.  XL    [facility]  Department of Pathology  [address] [place] [address]  Phone: [phone] Fax: [phone]       Disclaimer       One or more of the reagents used to perform assays on this specimen MAY have contained components considered to be analyte specific reagents (ASR's).  ASR's have not been cleared or approved by the U.S. Food and Drug Administration.  These assays were developed and their performance characteristics determined by the Department of Pathology at [facility]. The FDA does not require this test to go through premarket FDA review. This test is used for clinical purposes. It should not be regarded as investigational or for research. This laboratory is " certified under the Clinical Laboratory Improvement Amendments (CLIA) as qualified to perform high complexity clinical laboratory testing.  The assays were performed with appropriate positive and negative controls which stained appropriately.         3:55 PM      Results were successfully communicated with the patient and they acknowledged their understanding.

## 2025-07-31 ENCOUNTER — APPOINTMENT (OUTPATIENT)
Dept: HEMATOLOGY/ONCOLOGY | Facility: HOSPITAL | Age: 55
End: 2025-07-31
Payer: COMMERCIAL

## 2025-07-31 NOTE — TUMOR BOARD NOTE
MULTIDISCIPLINARY BREAST CANCER TUMOR BOARD CONFERENCE NOTE  Jud Colunga was presented at Breast Cancer Tumor Board Conference  Conference date: 7/31/2025  Presenting Provider(s): Dr. Maxwell  Present at Conference: Medical Oncology, Radiation Oncology, Surgical Oncology, Radiology, and Pathology Representatives  Conference Review Type: Treatment Plan Review    National Guidelines discussed: Yes    Surgical Resection: Surgery is complete.  Radiation therapy: indicated  Genomic Testing: no  Systemic therapy: consider endocrine tx  Clinical Trial Eligible: no  Genetics: meets NCCN criteria     Referral Recommendations:  Genetics, Radiation Oncology, and Medical Oncology    Cancer Staging:  Cancer Staging   Ductal carcinoma in situ of breast with microinvasive component, right  Staging form: Breast, AJCC 8th Edition  - Pathologic stage from 7/17/2025: Stage IA (pT1mi, pN0(sn), cM0, G3, ER+, MS+, HER2+) - Signed by Wendy Maxwell MD on 7/28/2025     Disclaimer  SCC tumor board recommendations represent the consensus opinion of physicians present at a weekly patient care conference. The treating SCC physician is not always present, and many of the physicians formulating the recommendation have not personally seen or examined the patient under discussion. It is understood that the treating SCC physician considers the expertise of the Tumor Board Recommendation in formulating his/her plan for the patient. However, in many situations, based on individualized patient considerations, a different plan is determined by the treating physician to be the optimal medical management.

## 2025-08-04 NOTE — PROGRESS NOTES
Subjective   Jud Colnuga is a 54 y.o. female here for a postoperative visit.  She had a right Magseed localized lumpectomy and right axillary sentinel lymph node biopsy on 7/17/2025.    Findings at that time were the following:   Microinvasive carcinoma in a background of DCIS  Tumor size: Invasive less than 0.1 cm ; DCIS 1.6 cm ; all margins > 2 mm  Tumor grade: High  Estrogen Receptor: 1-10%  Progesterone Receptor: 3%  Her-2 pio: Positive  Lymph node status: 0/1     Cancer Staging   Ductal carcinoma in situ of breast with microinvasive component, right  Staging form: Breast, AJCC 8th Edition  - Pathologic stage from 7/17/2025: Stage IA - Signed by Wendy Maxwell MD on 7/28/2025  pT1mi  pN0  cM0  Navi grade: G3  ER Status: Positive  MT Status: Positive  HER2 Status: Positive           Objective     Physical Exam  Chest:        Comments: Both incisions are healing well with no evidence of infection, seroma or hematoma.  Slight induration at lumpectomy incision         Alert and oriented.        Assessment/Plan   Stage IA right breast cancer diagnosed 5/28/2025.   -[id number]   Microinvasive carcinoma in a background of DCIS    She is doing well following surgery.  She may resume all activities without restrictions.  Return to work on 8/11/2025 with 2-week lifting restriction/light duty.    A copy of the pathology report was given to the patient and reviewed in detail with the patient.     Recommendations from interdisciplinary breast tumor conference were reviewed with the patient.    You will need to schedule an appointment with a medical oncologist to discuss possible hormonal therapy (estrogen blocking pill).  You will need to schedule an appointment with a radiation oncologist to discuss radiation therapy.  Need to schedule an appointment with a genetic counselor to discuss possible genetic testing.    Follow-up with me in March 2026 with a bilateral mammogram        Wendy Maxwell MD

## 2025-08-08 ENCOUNTER — OFFICE VISIT (OUTPATIENT)
Dept: SURGICAL ONCOLOGY | Facility: CLINIC | Age: 55
End: 2025-08-08
Payer: COMMERCIAL

## 2025-08-08 VITALS
TEMPERATURE: 97.2 F | DIASTOLIC BLOOD PRESSURE: 85 MMHG | SYSTOLIC BLOOD PRESSURE: 123 MMHG | BODY MASS INDEX: 33.71 KG/M2 | WEIGHT: 215.2 LBS | OXYGEN SATURATION: 95 % | HEART RATE: 87 BPM

## 2025-08-08 DIAGNOSIS — C50.911 DUCTAL CARCINOMA IN SITU OF BREAST WITH MICROINVASIVE COMPONENT, RIGHT: Primary | ICD-10-CM

## 2025-08-08 PROCEDURE — 3079F DIAST BP 80-89 MM HG: CPT | Performed by: SURGERY

## 2025-08-08 PROCEDURE — 1036F TOBACCO NON-USER: CPT | Performed by: SURGERY

## 2025-08-08 PROCEDURE — 3074F SYST BP LT 130 MM HG: CPT | Performed by: SURGERY

## 2025-08-08 PROCEDURE — 99211 OFF/OP EST MAY X REQ PHY/QHP: CPT | Performed by: SURGERY

## 2025-08-08 ASSESSMENT — PAIN SCALES - GENERAL: PAINLEVEL_OUTOF10: 5

## 2025-08-08 NOTE — PATIENT INSTRUCTIONS
Follow-up with Dr Maxwell in March 2026 with a bilateral mammogram.  Referrals for medical and radiation oncology have been placed- you may schedule prior to leaving the office or the scheduling dept. will call you to make these appointments.   Referral for genetics has been placed for you- the scheduling dept. will call you to make this appointment.

## 2025-08-12 ENCOUNTER — HOSPITAL ENCOUNTER (OUTPATIENT)
Dept: RADIATION ONCOLOGY | Facility: CLINIC | Age: 55
Setting detail: RADIATION/ONCOLOGY SERIES
Discharge: HOME | End: 2025-08-12
Payer: COMMERCIAL

## 2025-08-12 VITALS
TEMPERATURE: 96.3 F | SYSTOLIC BLOOD PRESSURE: 131 MMHG | WEIGHT: 218.4 LBS | OXYGEN SATURATION: 97 % | HEART RATE: 89 BPM | DIASTOLIC BLOOD PRESSURE: 85 MMHG | RESPIRATION RATE: 18 BRPM | BODY MASS INDEX: 34.28 KG/M2 | HEIGHT: 67 IN

## 2025-08-12 DIAGNOSIS — C50.911 DUCTAL CARCINOMA IN SITU OF BREAST WITH MICROINVASIVE COMPONENT, RIGHT: Primary | ICD-10-CM

## 2025-08-12 PROCEDURE — 99214 OFFICE O/P EST MOD 30 MIN: CPT | Performed by: RADIOLOGY

## 2025-08-12 PROCEDURE — 99204 OFFICE O/P NEW MOD 45 MIN: CPT | Performed by: RADIOLOGY

## 2025-08-12 SDOH — ECONOMIC STABILITY: FOOD INSECURITY: WITHIN THE PAST 12 MONTHS, THE FOOD YOU BOUGHT JUST DIDN'T LAST AND YOU DIDN'T HAVE MONEY TO GET MORE.: NEVER TRUE

## 2025-08-12 SDOH — ECONOMIC STABILITY: FOOD INSECURITY: WITHIN THE PAST 12 MONTHS, YOU WORRIED THAT YOUR FOOD WOULD RUN OUT BEFORE YOU GOT MONEY TO BUY MORE.: NEVER TRUE

## 2025-08-12 ASSESSMENT — PAIN SCALES - GENERAL: PAINLEVEL_OUTOF10: 0-NO PAIN

## 2025-08-12 ASSESSMENT — ENCOUNTER SYMPTOMS
HOT FLASHES: 1
NERVOUS/ANXIOUS: 1

## 2025-08-15 ENCOUNTER — HOSPITAL ENCOUNTER (OUTPATIENT)
Dept: RADIATION ONCOLOGY | Facility: CLINIC | Age: 55
Setting detail: RADIATION/ONCOLOGY SERIES
Discharge: HOME | End: 2025-08-15
Payer: COMMERCIAL

## 2025-08-15 ENCOUNTER — HOSPITAL ENCOUNTER (OUTPATIENT)
Dept: RADIOLOGY | Facility: EXTERNAL LOCATION | Age: 55
Discharge: HOME | End: 2025-08-15

## 2025-08-15 DIAGNOSIS — C50.311: Primary | ICD-10-CM

## 2025-08-15 DIAGNOSIS — C50.311: ICD-10-CM

## 2025-08-15 PROCEDURE — 77332 RADIATION TREATMENT AID(S): CPT | Performed by: RADIOLOGY

## 2025-08-15 PROCEDURE — 77290 THER RAD SIMULAJ FIELD CPLX: CPT | Performed by: RADIOLOGY

## 2025-08-18 ENCOUNTER — HOSPITAL ENCOUNTER (OUTPATIENT)
Dept: RADIATION ONCOLOGY | Facility: CLINIC | Age: 55
Setting detail: RADIATION/ONCOLOGY SERIES
Discharge: HOME | End: 2025-08-18
Payer: COMMERCIAL

## 2025-08-18 PROCEDURE — 77300 RADIATION THERAPY DOSE PLAN: CPT | Performed by: RADIOLOGY

## 2025-08-18 PROCEDURE — 77334 RADIATION TREATMENT AID(S): CPT | Performed by: RADIOLOGY

## 2025-08-18 PROCEDURE — 77295 3-D RADIOTHERAPY PLAN: CPT | Performed by: RADIOLOGY

## 2025-08-28 ENCOUNTER — HOSPITAL ENCOUNTER (OUTPATIENT)
Dept: RADIATION ONCOLOGY | Facility: CLINIC | Age: 55
Setting detail: RADIATION/ONCOLOGY SERIES
Discharge: HOME | End: 2025-08-28
Payer: COMMERCIAL

## 2025-08-28 DIAGNOSIS — C50.311 MALIGNANT NEOPLASM OF LOWER-INNER QUADRANT OF RIGHT FEMALE BREAST: ICD-10-CM

## 2025-08-28 LAB
RAD ONC MSQ ACTUAL FRACTIONS DELIVERED: 1
RAD ONC MSQ ACTUAL SESSION DELIVERED DOSE: 266 CGRAY
RAD ONC MSQ ACTUAL TOTAL DOSE: 266 CGRAY
RAD ONC MSQ ELAPSED DAYS: 0
RAD ONC MSQ LAST DATE: NORMAL
RAD ONC MSQ PRESCRIBED FRACTIONAL DOSE: 266 CGRAY
RAD ONC MSQ PRESCRIBED NUMBER OF FRACTIONS: 16
RAD ONC MSQ PRESCRIBED TECHNIQUE: NORMAL
RAD ONC MSQ PRESCRIBED TOTAL DOSE: 4256 CGRAY
RAD ONC MSQ PRESCRIPTION PATTERN COMMENT: NORMAL
RAD ONC MSQ START DATE: NORMAL
RAD ONC MSQ TREATMENT COURSE NUMBER: 1
RAD ONC MSQ TREATMENT SITE: NORMAL

## 2025-08-28 PROCEDURE — 77412 RADIATION TX DELIVERY LVL 3: CPT | Performed by: STUDENT IN AN ORGANIZED HEALTH CARE EDUCATION/TRAINING PROGRAM

## 2025-08-29 ENCOUNTER — HOSPITAL ENCOUNTER (OUTPATIENT)
Dept: RADIATION ONCOLOGY | Facility: CLINIC | Age: 55
Setting detail: RADIATION/ONCOLOGY SERIES
Discharge: HOME | End: 2025-08-29
Payer: COMMERCIAL

## 2025-08-29 ENCOUNTER — TELEPHONE (OUTPATIENT)
Dept: PRIMARY CARE | Facility: CLINIC | Age: 55
End: 2025-08-29
Payer: COMMERCIAL

## 2025-08-29 DIAGNOSIS — C50.311 MALIGNANT NEOPLASM OF LOWER-INNER QUADRANT OF RIGHT FEMALE BREAST: ICD-10-CM

## 2025-08-29 LAB
RAD ONC MSQ ACTUAL FRACTIONS DELIVERED: 2
RAD ONC MSQ ACTUAL SESSION DELIVERED DOSE: 266 CGRAY
RAD ONC MSQ ACTUAL TOTAL DOSE: 532 CGRAY
RAD ONC MSQ ELAPSED DAYS: 1
RAD ONC MSQ LAST DATE: NORMAL
RAD ONC MSQ PRESCRIBED FRACTIONAL DOSE: 266 CGRAY
RAD ONC MSQ PRESCRIBED NUMBER OF FRACTIONS: 16
RAD ONC MSQ PRESCRIBED TECHNIQUE: NORMAL
RAD ONC MSQ PRESCRIBED TOTAL DOSE: 4256 CGRAY
RAD ONC MSQ PRESCRIPTION PATTERN COMMENT: NORMAL
RAD ONC MSQ START DATE: NORMAL
RAD ONC MSQ TREATMENT COURSE NUMBER: 1
RAD ONC MSQ TREATMENT SITE: NORMAL

## 2025-09-02 ENCOUNTER — HOSPITAL ENCOUNTER (OUTPATIENT)
Dept: RADIATION ONCOLOGY | Facility: CLINIC | Age: 55
Setting detail: RADIATION/ONCOLOGY SERIES
Discharge: HOME | End: 2025-09-02
Payer: COMMERCIAL

## 2025-09-02 DIAGNOSIS — C50.311 MALIGNANT NEOPLASM OF LOWER-INNER QUADRANT OF RIGHT FEMALE BREAST: ICD-10-CM

## 2025-09-02 DIAGNOSIS — Z51.0 ENCOUNTER FOR ANTINEOPLASTIC RADIATION THERAPY: ICD-10-CM

## 2025-09-02 LAB
RAD ONC MSQ ACTUAL FRACTIONS DELIVERED: 3
RAD ONC MSQ ACTUAL SESSION DELIVERED DOSE: 266 CGRAY
RAD ONC MSQ ACTUAL TOTAL DOSE: 798 CGRAY
RAD ONC MSQ ELAPSED DAYS: 5
RAD ONC MSQ LAST DATE: NORMAL
RAD ONC MSQ PRESCRIBED FRACTIONAL DOSE: 266 CGRAY
RAD ONC MSQ PRESCRIBED NUMBER OF FRACTIONS: 16
RAD ONC MSQ PRESCRIBED TECHNIQUE: NORMAL
RAD ONC MSQ PRESCRIBED TOTAL DOSE: 4256 CGRAY
RAD ONC MSQ PRESCRIPTION PATTERN COMMENT: NORMAL
RAD ONC MSQ START DATE: NORMAL
RAD ONC MSQ TREATMENT COURSE NUMBER: 1
RAD ONC MSQ TREATMENT SITE: NORMAL

## 2025-09-02 PROCEDURE — 77336 RADIATION PHYSICS CONSULT: CPT | Performed by: RADIOLOGY

## 2025-09-02 PROCEDURE — 77387 GUIDANCE FOR RADJ TX DLVR: CPT | Performed by: RADIOLOGY

## 2025-09-02 PROCEDURE — 77412 RADIATION TX DELIVERY LVL 3: CPT | Performed by: RADIOLOGY

## 2025-09-03 ENCOUNTER — HOSPITAL ENCOUNTER (OUTPATIENT)
Dept: RADIATION ONCOLOGY | Facility: CLINIC | Age: 55
Setting detail: RADIATION/ONCOLOGY SERIES
Discharge: HOME | End: 2025-09-03
Payer: COMMERCIAL

## 2025-09-03 ENCOUNTER — TELEMEDICINE (OUTPATIENT)
Dept: PRIMARY CARE | Facility: CLINIC | Age: 55
End: 2025-09-03
Payer: COMMERCIAL

## 2025-09-03 VITALS
SYSTOLIC BLOOD PRESSURE: 124 MMHG | DIASTOLIC BLOOD PRESSURE: 86 MMHG | TEMPERATURE: 97.7 F | WEIGHT: 212 LBS | BODY MASS INDEX: 33.2 KG/M2 | HEART RATE: 82 BPM | OXYGEN SATURATION: 97 % | RESPIRATION RATE: 20 BRPM

## 2025-09-03 DIAGNOSIS — Z51.0 ENCOUNTER FOR ANTINEOPLASTIC RADIATION THERAPY: ICD-10-CM

## 2025-09-03 DIAGNOSIS — C50.311 MALIGNANT NEOPLASM OF LOWER-INNER QUADRANT OF RIGHT FEMALE BREAST: ICD-10-CM

## 2025-09-03 DIAGNOSIS — F41.0 PANIC ATTACKS: Primary | ICD-10-CM

## 2025-09-03 LAB
RAD ONC MSQ ACTUAL FRACTIONS DELIVERED: 4
RAD ONC MSQ ACTUAL SESSION DELIVERED DOSE: 266 CGRAY
RAD ONC MSQ ACTUAL TOTAL DOSE: 1064 CGRAY
RAD ONC MSQ ELAPSED DAYS: 6
RAD ONC MSQ LAST DATE: NORMAL
RAD ONC MSQ PRESCRIBED FRACTIONAL DOSE: 266 CGRAY
RAD ONC MSQ PRESCRIBED NUMBER OF FRACTIONS: 16
RAD ONC MSQ PRESCRIBED TECHNIQUE: NORMAL
RAD ONC MSQ PRESCRIBED TOTAL DOSE: 4256 CGRAY
RAD ONC MSQ PRESCRIPTION PATTERN COMMENT: NORMAL
RAD ONC MSQ START DATE: NORMAL
RAD ONC MSQ TREATMENT COURSE NUMBER: 1
RAD ONC MSQ TREATMENT SITE: NORMAL

## 2025-09-03 PROCEDURE — 77387 GUIDANCE FOR RADJ TX DLVR: CPT | Performed by: RADIOLOGY

## 2025-09-03 PROCEDURE — 77412 RADIATION TX DELIVERY LVL 3: CPT | Performed by: RADIOLOGY

## 2025-09-03 PROCEDURE — 1036F TOBACCO NON-USER: CPT | Performed by: PHYSICIAN ASSISTANT

## 2025-09-03 PROCEDURE — 99214 OFFICE O/P EST MOD 30 MIN: CPT | Performed by: PHYSICIAN ASSISTANT

## 2025-09-03 RX ORDER — ALPRAZOLAM 0.5 MG/1
0.5 TABLET ORAL DAILY PRN
Qty: 16 TABLET | Refills: 0 | Status: SHIPPED | OUTPATIENT
Start: 2025-09-03 | End: 2025-09-19

## 2025-09-03 ASSESSMENT — PAIN SCALES - GENERAL: PAINLEVEL_OUTOF10: 0-NO PAIN

## 2025-09-04 ENCOUNTER — PATIENT OUTREACH (OUTPATIENT)
Dept: HEMATOLOGY/ONCOLOGY | Facility: CLINIC | Age: 55
End: 2025-09-04
Payer: COMMERCIAL

## 2025-09-04 ENCOUNTER — HOSPITAL ENCOUNTER (OUTPATIENT)
Dept: RADIATION ONCOLOGY | Facility: CLINIC | Age: 55
Setting detail: RADIATION/ONCOLOGY SERIES
Discharge: HOME | End: 2025-09-04
Payer: COMMERCIAL

## 2025-09-04 DIAGNOSIS — C50.311 MALIGNANT NEOPLASM OF LOWER-INNER QUADRANT OF RIGHT FEMALE BREAST: ICD-10-CM

## 2025-09-04 DIAGNOSIS — Z51.0 ENCOUNTER FOR ANTINEOPLASTIC RADIATION THERAPY: ICD-10-CM

## 2025-09-04 LAB
RAD ONC MSQ ACTUAL FRACTIONS DELIVERED: 5
RAD ONC MSQ ACTUAL SESSION DELIVERED DOSE: 266 CGRAY
RAD ONC MSQ ACTUAL TOTAL DOSE: 1330 CGRAY
RAD ONC MSQ ELAPSED DAYS: 7
RAD ONC MSQ LAST DATE: NORMAL
RAD ONC MSQ PRESCRIBED FRACTIONAL DOSE: 266 CGRAY
RAD ONC MSQ PRESCRIBED NUMBER OF FRACTIONS: 16
RAD ONC MSQ PRESCRIBED TECHNIQUE: NORMAL
RAD ONC MSQ PRESCRIBED TOTAL DOSE: 4256 CGRAY
RAD ONC MSQ PRESCRIPTION PATTERN COMMENT: NORMAL
RAD ONC MSQ START DATE: NORMAL
RAD ONC MSQ TREATMENT COURSE NUMBER: 1
RAD ONC MSQ TREATMENT SITE: NORMAL

## 2025-09-04 PROCEDURE — 77412 RADIATION TX DELIVERY LVL 3: CPT | Performed by: RADIOLOGY

## 2025-09-04 PROCEDURE — 77387 GUIDANCE FOR RADJ TX DLVR: CPT | Performed by: RADIOLOGY

## 2025-09-05 ENCOUNTER — HOSPITAL ENCOUNTER (OUTPATIENT)
Dept: RADIATION ONCOLOGY | Facility: CLINIC | Age: 55
Setting detail: RADIATION/ONCOLOGY SERIES
Discharge: HOME | End: 2025-09-05
Payer: COMMERCIAL

## 2025-09-05 DIAGNOSIS — C50.311 MALIGNANT NEOPLASM OF LOWER-INNER QUADRANT OF RIGHT FEMALE BREAST: ICD-10-CM

## 2025-09-05 DIAGNOSIS — Z51.0 ENCOUNTER FOR ANTINEOPLASTIC RADIATION THERAPY: ICD-10-CM

## 2025-09-05 LAB
RAD ONC MSQ ACTUAL FRACTIONS DELIVERED: 6
RAD ONC MSQ ACTUAL SESSION DELIVERED DOSE: 266 CGRAY
RAD ONC MSQ ACTUAL TOTAL DOSE: 1596 CGRAY
RAD ONC MSQ ELAPSED DAYS: 8
RAD ONC MSQ LAST DATE: NORMAL
RAD ONC MSQ PRESCRIBED FRACTIONAL DOSE: 266 CGRAY
RAD ONC MSQ PRESCRIBED NUMBER OF FRACTIONS: 16
RAD ONC MSQ PRESCRIBED TECHNIQUE: NORMAL
RAD ONC MSQ PRESCRIBED TOTAL DOSE: 4256 CGRAY
RAD ONC MSQ PRESCRIPTION PATTERN COMMENT: NORMAL
RAD ONC MSQ START DATE: NORMAL
RAD ONC MSQ TREATMENT COURSE NUMBER: 1
RAD ONC MSQ TREATMENT SITE: NORMAL

## 2025-10-06 ENCOUNTER — APPOINTMENT (OUTPATIENT)
Facility: CLINIC | Age: 55
End: 2025-10-06
Payer: COMMERCIAL

## 2025-11-26 ENCOUNTER — APPOINTMENT (OUTPATIENT)
Dept: PRIMARY CARE | Facility: CLINIC | Age: 55
End: 2025-11-26
Payer: COMMERCIAL

## (undated) DEVICE — TOWEL, SURGICAL, NEURO, O/R, 16 X 26, BLUE, STERILE

## (undated) DEVICE — STRIP, SKIN CLOSURE, STERI STRIP, REINFORCED, 0.5 X 4 IN

## (undated) DEVICE — GLOVE, SURGICAL, PROTEXIS PI MICRO, 6.0, PF, LF

## (undated) DEVICE — DISSECTOR, EXACT, LIGASURE

## (undated) DEVICE — CLIP, LIGATING, HORIZON, MEDIUM, TITANIUM

## (undated) DEVICE — CLIP, LIGATING, HORIZON, WIDE SLOT, SMALL, TITANIUM

## (undated) DEVICE — DRESSING, GAUZE, FLUFF, 1 PLY, 18 X 36 IN

## (undated) DEVICE — GLOVE, SURGICAL, PROTEXIS PI BLUE W/NEUTHERA, 6.5, PF, LF

## (undated) DEVICE — DRAPE, SHEET, THREE QUARTER, FAN FOLD, 57 X 77 IN

## (undated) DEVICE — KIT, MARGINMARKER, 6 INK COLORS, STANDARD

## (undated) DEVICE — SUTURE, SILK, 3-0, 30 IN, BR SH, BLACK

## (undated) DEVICE — SPONGE GAUZE, XRAY SC+RFID, 4X4 16 PLY, STERILE

## (undated) DEVICE — Device

## (undated) DEVICE — PAD, GROUNDING, ELECTROSURGICAL, W/9 FT CABLE, POLYHESIVE II, ADULT, LF

## (undated) DEVICE — SUTURE, MONOCRYL, 4-0, 18 IN, PS2, UNDYED

## (undated) DEVICE — DRESSING, NON-ADHERENT, TELFA, OUCHLESS, 3 X 8 IN, STERILE

## (undated) DEVICE — COVER, TRANSDUCER, NEO GUARD, 2.6 X 30CM, LF

## (undated) DEVICE — SUTURE, VICRYL PLUS 3-0, SH, 27IN

## (undated) DEVICE — DRAPE, INSTRUMENT, W/POUCH, STERI DRAPE, 7 X 11 IN, DISPOSABLE, STERILE

## (undated) DEVICE — ELECTRODE, ELECTROSURGICAL, BLADE, INSULATED, ENT/IMA, STERILE

## (undated) DEVICE — PROBE COVER, INTRAOPERATIVE, 13 X 244CM (5 X 96IN)